# Patient Record
Sex: MALE | Race: WHITE | NOT HISPANIC OR LATINO | Employment: UNEMPLOYED | URBAN - METROPOLITAN AREA
[De-identification: names, ages, dates, MRNs, and addresses within clinical notes are randomized per-mention and may not be internally consistent; named-entity substitution may affect disease eponyms.]

---

## 2017-01-04 ENCOUNTER — APPOINTMENT (OUTPATIENT)
Dept: PHYSICAL THERAPY | Facility: CLINIC | Age: 42
End: 2017-01-04
Payer: COMMERCIAL

## 2017-01-04 PROCEDURE — 97110 THERAPEUTIC EXERCISES: CPT

## 2017-01-04 PROCEDURE — 97140 MANUAL THERAPY 1/> REGIONS: CPT

## 2017-01-05 ENCOUNTER — GENERIC CONVERSION - ENCOUNTER (OUTPATIENT)
Dept: OTHER | Facility: OTHER | Age: 42
End: 2017-01-05

## 2017-01-05 ENCOUNTER — APPOINTMENT (OUTPATIENT)
Dept: PHYSICAL THERAPY | Facility: CLINIC | Age: 42
End: 2017-01-05
Payer: COMMERCIAL

## 2017-01-05 PROCEDURE — 97140 MANUAL THERAPY 1/> REGIONS: CPT

## 2017-01-05 PROCEDURE — 97010 HOT OR COLD PACKS THERAPY: CPT

## 2017-01-05 PROCEDURE — 97110 THERAPEUTIC EXERCISES: CPT

## 2017-01-10 ENCOUNTER — APPOINTMENT (OUTPATIENT)
Dept: PHYSICAL THERAPY | Facility: CLINIC | Age: 42
End: 2017-01-10
Payer: COMMERCIAL

## 2017-01-10 PROCEDURE — 97110 THERAPEUTIC EXERCISES: CPT

## 2017-01-10 PROCEDURE — 97140 MANUAL THERAPY 1/> REGIONS: CPT

## 2017-01-10 PROCEDURE — 97010 HOT OR COLD PACKS THERAPY: CPT

## 2017-01-12 ENCOUNTER — APPOINTMENT (OUTPATIENT)
Dept: PHYSICAL THERAPY | Facility: CLINIC | Age: 42
End: 2017-01-12
Payer: COMMERCIAL

## 2017-01-16 ENCOUNTER — ALLSCRIPTS OFFICE VISIT (OUTPATIENT)
Dept: OTHER | Facility: OTHER | Age: 42
End: 2017-01-16

## 2017-02-27 ENCOUNTER — ALLSCRIPTS OFFICE VISIT (OUTPATIENT)
Dept: OTHER | Facility: OTHER | Age: 42
End: 2017-02-27

## 2018-01-12 VITALS
WEIGHT: 315 LBS | DIASTOLIC BLOOD PRESSURE: 82 MMHG | SYSTOLIC BLOOD PRESSURE: 125 MMHG | HEART RATE: 108 BPM | BODY MASS INDEX: 51.83 KG/M2

## 2018-01-14 VITALS
SYSTOLIC BLOOD PRESSURE: 142 MMHG | WEIGHT: 315 LBS | HEART RATE: 107 BPM | BODY MASS INDEX: 50.25 KG/M2 | DIASTOLIC BLOOD PRESSURE: 101 MMHG

## 2018-01-31 RX ORDER — HYDROCODONE BITARTRATE AND ACETAMINOPHEN 5; 325 MG/1; MG/1
TABLET ORAL
COMMUNITY
End: 2019-07-31

## 2018-02-01 VITALS
BODY MASS INDEX: 52.92 KG/M2 | SYSTOLIC BLOOD PRESSURE: 102 MMHG | DIASTOLIC BLOOD PRESSURE: 67 MMHG | WEIGHT: 315 LBS | HEART RATE: 92 BPM

## 2018-02-01 DIAGNOSIS — S46.211S BICEPS RUPTURE, DISTAL, RIGHT, SEQUELA: Primary | ICD-10-CM

## 2018-02-01 PROCEDURE — 99213 OFFICE O/P EST LOW 20 MIN: CPT | Performed by: ORTHOPAEDIC SURGERY

## 2018-02-01 NOTE — PROGRESS NOTES
Assessment:       1  Biceps rupture, distal, right, sequela          Plan:        - we will provide PT script for right biceps strengthening  - we have provided him with information for a physiatrist he can see for any work restriction issues moving forward   - he demonstrates appropriate healing of right distal biceps rupture s/p repair            Subjective:     Patient ID: Jayme Grier is a 39 y o  male  Chief Complaint:    Patient had right distal biceps rupture repair done in November 2016  He demonstrated appropriate healing post-operatively, and initially began PT  However due to insurance issues he was unable to complete full course of PT  Recently, he noted some weakness in the right upper extremity while on a job climbing down a ladder, he is interested in resuming PT for ongoing strengthening  He is still able to perform appropriate elbow flexion and his strength issues resolved after the recent episode  No other complaints  Social History     Occupational History    Not on file  Social History Main Topics    Smoking status: Never Smoker    Smokeless tobacco: Never Used    Alcohol use No    Drug use: No    Sexual activity: Not on file      Review of Systems   Constitutional: Negative  Negative for chills and fever  HENT: Negative  Respiratory: Negative  Negative for shortness of breath and wheezing  Cardiovascular: Negative  Negative for chest pain and palpitations  Gastrointestinal: Negative  Negative for nausea and vomiting  Endocrine: Negative  Genitourinary: Negative  Skin: Negative  Allergic/Immunologic: Negative  Neurological: Negative  Negative for numbness  Hematological: Negative  Psychiatric/Behavioral: Negative  Objective:     Right Elbow Exam     Tenderness   The patient is experiencing no tenderness           Range of Motion   Extension: normal   Flexion: normal   Pronation: normal   Supination: normal     Muscle Strength The patient has normal right elbow strength  Pronation:  5/5   Supination:  5/5     Other   Erythema: absent  Right elbow scars: well healed, keloid formation  Sensation: normal  Pulse: present    Comments:  Negative active biceps test        Physical Exam   Constitutional: He is oriented to person, place, and time  He appears well-developed and well-nourished  HENT:   Head: Normocephalic and atraumatic  Neck: Normal range of motion  Cardiovascular: Normal rate and intact distal pulses  Pulmonary/Chest: Effort normal  He has no wheezes  Abdominal: Soft  He exhibits no distension  Neurological: He is alert and oriented to person, place, and time  Skin: Skin is warm and dry  Psychiatric: He has a normal mood and affect           No pertinent imaging to be reviewed at today's visit

## 2018-02-06 ENCOUNTER — EVALUATION (OUTPATIENT)
Dept: PHYSICAL THERAPY | Facility: CLINIC | Age: 43
End: 2018-02-06
Payer: COMMERCIAL

## 2018-02-06 DIAGNOSIS — S46.211S BICEPS RUPTURE, DISTAL, RIGHT, SEQUELA: ICD-10-CM

## 2018-02-06 PROCEDURE — 97162 PT EVAL MOD COMPLEX 30 MIN: CPT | Performed by: PHYSICAL THERAPIST

## 2018-02-06 PROCEDURE — 97110 THERAPEUTIC EXERCISES: CPT | Performed by: PHYSICAL THERAPIST

## 2018-02-06 PROCEDURE — G8984 CARRY CURRENT STATUS: HCPCS | Performed by: PHYSICAL THERAPIST

## 2018-02-06 PROCEDURE — G8985 CARRY GOAL STATUS: HCPCS | Performed by: PHYSICAL THERAPIST

## 2018-02-06 RX ORDER — PENICILLIN V POTASSIUM 500 MG/1
500 TABLET ORAL EVERY 6 HOURS SCHEDULED
COMMUNITY
End: 2019-07-31

## 2018-02-06 NOTE — PROGRESS NOTES
PT Evaluation     Today's date: 2018  Patient name: Cephas Harada  : 1975  MRN: [id number]  Referring provider: Pamela Whitten MD  Dx:   Encounter Diagnosis   Name Primary?  Biceps rupture, distal, right, sequela                   Assessment    Assessment details: Cephas Harada is a pleasant 39 y o  presenting to physical therapy with MD referral for Biceps rupture, distal, right, sequela  Problem list: Poor posture, decreased upper extremity strength, poor  strength, decreased functional upper extremity strength, and mild decrease in elbow AROM extension  Treatment to include: Manual therapy techniques as needed, functional upper extremity and core strengthening, instruction in a comprehensive HEP, modalities as needed  This pt would benefit from skilled PT services to address their impairments and functional limitations to maximize functional outcome  Barriers to therapy: BMI of 52 92, working in [facility]- unsure how long he will be in the area to do PT  Understanding of Dx/Px/POC: good   Prognosis: good    Goals  ST  Pt will improve  strength on right to at least 40 lbs in 3 weeks  2  Pt will be able to perform 30 wall push ups with no more than mild fatigue in 3 weeks  LT  Pt will be able to perform job tasks in 6 weeks  2  Pt will be independent in a comprehensive HEP in 6 weeks  Subjective Evaluation    History of Present Illness  Mechanism of injury: Pt underwent distal biceps tendon repair in 2016  Pt attended physical therapy for some time; however, due to insurance issues, pt was unable to continue with physical therapy  Pt has since returned to work; however, his job role has changed recently requiring him to climb ladders (8-10 stories)  Pt states upon descending the ladder, his right arm completely exhausted and he almost fell from the ladder  Pt returned to surgeon to ensure there was no damage to his biceps repair   MD inspected and repair was intact  MD provided referral to PT to improve muscle strength and endurance as well as a referral to a physiatrist for occupation restrictions  Pt reports occasional tingling due to ulnar nerve transposition  Premorbid status:  - ADLs: Independent with no difficulty  - Work: Full time, Full duty  - Recreation: weight training 3 x per week and cardio daily    Current status:  - ADLs/Functional activities:     - Reaching into shoulder height cabinets with Pain Levels: no pain   - Reaching into overhead cabinets with Pain Levels: no pain   - Washing lower back/tucking in shirt with Pain Levels: no pain   - Washing hair with Pain Levels: no pain   - Driving with Pain Levels: no pain   - Lifting 50# luggage < 5x without pain, but with multiple attempts fatigue   - Climbing ladders with severe fatigue and difficulty   - Sleeping with 0 nightly sleep disturbances due to pain  - Moderate difficulty opening tight jars  - Work: Full time, Full duty  - Recreation: cardio   Pain  Current pain ratin  At best pain ratin  At worst pain ratin  Location: biceps  Quality: burning  Relieving factors: rest  Progression: improved    Patient Goals  Patient goals for therapy: increased strength          Objective     Palpation     Additional Palpation Details  Tenderness to palpation over distal biceps tendon on right  Hypertrophic scar tissue present with good mobility over underlying tissue      Active Range of Motion     Left Elbow   Flexion: 138 degrees   Extension: 3 degrees     Right Elbow   Flexion: 137 degrees   Extension: 5 degrees     Passive Range of Motion     Left Elbow   Extension: 0 degrees     Right Elbow   Extension: 0 degrees     Strength/Myotome Testing     Left Shoulder     Planes of Motion   Flexion: 4   Abduction: 4   External rotation at 0°: 5   Internal rotation at 0°: 5     Right Shoulder     Planes of Motion   Flexion: 4   Abduction: 4   External rotation at 0°: 4+   Internal rotation at 0°: 4+     Left Elbow   Flexion: 5  Extension: 5  Forearm supination: 5  Forearm pronation: 5    Right Elbow   Flexion: 4  Extension: 4  Forearm supination: 4  Forearm pronation: 4    Left Wrist/Hand   Wrist extension: 5  Wrist flexion: 5     (2nd hand position)     Trial 1: 40    Trial 2: 55    Trial 3: 60    Right Wrist/Hand   Wrist extension: 4  Wrist flexion: 4     (2nd hand position)     Trial 1: 25    Trial 2: 25    Trial 3: 20    Additional Strength Details  Wall push ups: moderate fatigue after 10 reps  Precautions: distal biceps surgery November 2016    Daily Treatment Diary     Manual  2-6            None                                                                     Exercise Diary  2-6            UBE front/back 2'/2' NV level 2            Wall push ups 2 x 10 NV                         Bicep curls (3 way) 2 x 10 ea 5# NV                         TB:             - Tricep extension 2 x 10 Blue NV            - Rows 2 x 10 Black NV            - Pulldowns  2 x 10 Blue NV                         Shoulder flex, scap, abd 2 x 10 3# NV                         Digiflex all fingers 10 x 10" NV Black            Digiflex ea finger 20x Black NV                                                                                              Modalities  2-6            Cryo as needed                                         On initial evaluation, educated pt to consider joining the wellness program to focus on lower extremity strengthening and conditioning  Provided pt with basic Hep and therband and ensured proper exercise performance

## 2018-02-21 ENCOUNTER — APPOINTMENT (OUTPATIENT)
Dept: PHYSICAL THERAPY | Facility: CLINIC | Age: 43
End: 2018-02-21
Payer: COMMERCIAL

## 2018-02-26 ENCOUNTER — OFFICE VISIT (OUTPATIENT)
Dept: PHYSICAL THERAPY | Facility: CLINIC | Age: 43
End: 2018-02-26
Payer: COMMERCIAL

## 2018-02-26 DIAGNOSIS — S46.211S BICEPS RUPTURE, DISTAL, RIGHT, SEQUELA: Primary | ICD-10-CM

## 2018-02-26 PROCEDURE — 97110 THERAPEUTIC EXERCISES: CPT

## 2018-02-26 NOTE — PROGRESS NOTES
Daily Note     Today's date: 2018  Patient name: Misha Glez  : 1975  MRN: [id number]  Referring provider: Bette Moser MD  Dx:   Encounter Diagnosis     ICD-10-CM    1  Biceps rupture, distal, right, sequela S46 211S                   Subjective: Pt states no new complaints  Objective: See treatment diary below  Precautions: distal biceps surgery 2016     Daily Treatment Diary      Manual  -                   None                                                                                                                             Exercise Diary  -                   UBE front/back 2'/2' NV level 2  2'/2' lev 3                   Wall push ups 2 x 10 NV  2x10                                           Bicep curls (3 way) 2 x 10 ea 5# NV  5# 2x10 ea                                           TB:                       - Tricep extension 2 x 10 Blue NV  blue   3x10                   - Rows 2 x 10 Black NV  black 3x10                   - Pulldowns  2 x 10 Blue NV  blue 3x10                                           Shoulder flex, scap, abd 2 x 10 3# NV  3# 2x10ea                                           Digiflex all fingers 10 x 10" NV Black  10x10"  black                   Digiflex ea finger 20x Black NV  black 20x                                                                                                                                                                         Modalities  -                   Cryo as needed    np                                                                         Assessment: Tolerated treatment well  Patient would benefit from continued PT  Pt most challenged, fatigued by 3 way shdr exercises,single digiflex and suhas wall push ups  Plan: Progress treatment as tolerated

## 2018-03-02 ENCOUNTER — OFFICE VISIT (OUTPATIENT)
Dept: PHYSICAL THERAPY | Facility: CLINIC | Age: 43
End: 2018-03-02
Payer: COMMERCIAL

## 2018-03-02 DIAGNOSIS — S46.211S BICEPS RUPTURE, DISTAL, RIGHT, SEQUELA: Primary | ICD-10-CM

## 2018-03-02 PROCEDURE — 97110 THERAPEUTIC EXERCISES: CPT

## 2018-03-02 NOTE — PROGRESS NOTES
Daily Note     Today's date: 3/2/2018  Patient name: Elsie Renteria  : 1975  MRN: [id number]  Referring provider: Denisa Guo MD  Dx:   Encounter Diagnosis     ICD-10-CM    1  Biceps rupture, distal, right, sequela S46 211S                   Subjective: Pt reports DOMS of biceps after last visit  Objective: See treatment diary below  Precautions: distal biceps surgery 2016     Daily Treatment Diary      Manual  -                   None                                                                                                                             Exercise Diary  -6  2/26  3/1                 UBE front/back 2'/2' NV level 2  2'/2' lev 3  2'/2' lev 3                 Wall push ups 2 x 10 NV  2x10  2x10                                         Bicep curls (3 way) 2 x 10 ea 5# NV  5# 2x10 ea  5# 3x10 ea                                         TB:                       - Tricep extension 2 x 10 Blue NV  blue   3x10  blk 3x10                 - Rows 2 x 10 Black NV  black 3x10  black 3x10                 - Pulldowns  2 x 10 Blue NV  blue 3x10  black 3x10                                         Shoulder flex, scap, abd 2 x 10 3# NV  3# 2x10ea  3# 2x10 ea                                         Digiflex all fingers 10 x 10" NV Black  10x10"  black  blk 10x10"                 Digiflex ea finger 20x Black NV  black 20x  black x20                                                                                                                                                                       Modalities  -                   Cryo as needed    np                                                                         Assessment: Tolerated treatment well  Patient would benefit from continued PT  Continues to be challenged by wall pushups  Plan: Progress treatment as tolerated

## 2018-03-05 ENCOUNTER — OFFICE VISIT (OUTPATIENT)
Dept: PHYSICAL THERAPY | Facility: CLINIC | Age: 43
End: 2018-03-05
Payer: COMMERCIAL

## 2018-03-05 DIAGNOSIS — S46.211S BICEPS RUPTURE, DISTAL, RIGHT, SEQUELA: Primary | ICD-10-CM

## 2018-03-05 PROCEDURE — 97110 THERAPEUTIC EXERCISES: CPT

## 2018-03-05 NOTE — PROGRESS NOTES
Daily Note     Today's date: 3/5/2018  Patient name: Jean Paul Fleming  : 1975  MRN: [id number]  Referring provider: Priya Ritter MD  Dx:   Encounter Diagnosis     ICD-10-CM    1  Biceps rupture, distal, right, sequela S46 211S                   Subjective: Pt states no new complaints  Objective: See treatment diary below  Precautions: distal biceps surgery 2016     Daily Treatment Diary      Manual  -                   None                                                                                                                             Exercise Diary  -6  2/26  3/1  3               UBE front/back 2'/2' NV level 2  2'/2' lev 3  2'/2' lev 3  2'/2' lev 3               Wall push ups 2 x 10 NV  2x10    3x10  table nv                                     Bicep curls (3 way) 2 x 10 ea 5# NV  5# 2x10 ea  5# 3x10 ea  6# 3x10 ea                                       TB:                       - Tricep extension 2 x 10 Blue NV  blue   3x10  blk 3x10  blk 3x10               - Rows 2 x 10 Black NV  black 3x10  black 3x10  blk 3x10               - Pulldowns  2 x 10 Blue NV  blue 3x10  black 3x10  blk 3x10                - D1, D2 flexion        gr 2x10               Shoulder flex, scap, abd 2 x 10 3# NV  3# 2x10ea  3#  3# 3x10 ea  CLX nv                                     Digiflex all fingers 10 x 10" NV Black  10x10"  black   10x10"               Digiflex ea finger 20x Black NV  black 20x    20x ea                                                                                                                                                                     Modalities  2-6                     Cryo as needed    np                                                                         Assessment: Tolerated treatment well  Patient exhibited good technique with therapeutic exercises  Progressed most TE to 30 reps each; min difficulty noted   Also added D1, D2 flexion w/ GTB w/ min fatigue noted  Plan: Progress treatment as tolerated

## 2018-03-09 ENCOUNTER — APPOINTMENT (OUTPATIENT)
Dept: PHYSICAL THERAPY | Facility: CLINIC | Age: 43
End: 2018-03-09
Payer: COMMERCIAL

## 2018-03-28 PROCEDURE — G8986 CARRY D/C STATUS: HCPCS | Performed by: PHYSICAL THERAPIST

## 2018-03-28 PROCEDURE — G8985 CARRY GOAL STATUS: HCPCS | Performed by: PHYSICAL THERAPIST

## 2018-03-28 NOTE — PROGRESS NOTES
Addendum to add discharge G-codes and resolve episode  Pt become non-compliant with outlined POC and has not returned follow-up phone calls  At this point, pt will be discharged

## 2019-07-31 ENCOUNTER — HOSPITAL ENCOUNTER (EMERGENCY)
Facility: HOSPITAL | Age: 44
End: 2019-08-01
Attending: EMERGENCY MEDICINE | Admitting: EMERGENCY MEDICINE
Payer: COMMERCIAL

## 2019-07-31 DIAGNOSIS — T50.902A INTENTIONAL DRUG OVERDOSE, INITIAL ENCOUNTER (HCC): Primary | ICD-10-CM

## 2019-07-31 DIAGNOSIS — Z00.8 MEDICAL CLEARANCE FOR PSYCHIATRIC ADMISSION: Primary | ICD-10-CM

## 2019-07-31 LAB
ALBUMIN SERPL BCP-MCNC: 3.5 G/DL (ref 3.5–5)
ALBUMIN SERPL BCP-MCNC: 3.8 G/DL (ref 3.5–5)
ALP SERPL-CCNC: 73 U/L (ref 46–116)
ALP SERPL-CCNC: 79 U/L (ref 46–116)
ALT SERPL W P-5'-P-CCNC: 28 U/L (ref 12–78)
ALT SERPL W P-5'-P-CCNC: 39 U/L (ref 12–78)
AMPHETAMINES SERPL QL SCN: NEGATIVE
ANION GAP SERPL CALCULATED.3IONS-SCNC: 13 MMOL/L (ref 4–13)
APAP SERPL-MCNC: 6.5 UG/ML (ref 10–20)
APAP SERPL-MCNC: <2 UG/ML (ref 10–20)
APTT PPP: 24 SECONDS (ref 23–37)
AST SERPL W P-5'-P-CCNC: 10 U/L (ref 5–45)
AST SERPL W P-5'-P-CCNC: 14 U/L (ref 5–45)
ATRIAL RATE: 103 BPM
BACTERIA UR QL AUTO: ABNORMAL /HPF
BARBITURATES UR QL: NEGATIVE
BASOPHILS # BLD AUTO: 0.05 THOUSANDS/ΜL (ref 0–0.1)
BASOPHILS NFR BLD AUTO: 1 % (ref 0–1)
BENZODIAZ UR QL: NEGATIVE
BILIRUB DIRECT SERPL-MCNC: 0.08 MG/DL (ref 0–0.2)
BILIRUB SERPL-MCNC: 0.4 MG/DL (ref 0.2–1)
BILIRUB SERPL-MCNC: 0.4 MG/DL (ref 0.2–1)
BILIRUB UR QL STRIP: NEGATIVE
BUN SERPL-MCNC: 9 MG/DL (ref 5–25)
CALCIUM SERPL-MCNC: 8.8 MG/DL (ref 8.3–10.1)
CHLORIDE SERPL-SCNC: 106 MMOL/L (ref 100–108)
CLARITY UR: CLEAR
CO2 SERPL-SCNC: 23 MMOL/L (ref 21–32)
COCAINE UR QL: NEGATIVE
COLOR UR: YELLOW
CREAT SERPL-MCNC: 1.43 MG/DL (ref 0.6–1.3)
EOSINOPHIL # BLD AUTO: 0.07 THOUSAND/ΜL (ref 0–0.61)
EOSINOPHIL NFR BLD AUTO: 2 % (ref 0–6)
ERYTHROCYTE [DISTWIDTH] IN BLOOD BY AUTOMATED COUNT: 13.2 % (ref 11.6–15.1)
ETHANOL SERPL-MCNC: <3 MG/DL (ref 0–3)
GFR SERPL CREATININE-BSD FRML MDRD: 59 ML/MIN/1.73SQ M
GLUCOSE SERPL-MCNC: 103 MG/DL (ref 65–140)
GLUCOSE UR STRIP-MCNC: NEGATIVE MG/DL
HCT VFR BLD AUTO: 49.5 % (ref 36.5–49.3)
HGB BLD-MCNC: 16.1 G/DL (ref 12–17)
HGB UR QL STRIP.AUTO: ABNORMAL
IMM GRANULOCYTES # BLD AUTO: 0.01 THOUSAND/UL (ref 0–0.2)
IMM GRANULOCYTES NFR BLD AUTO: 0 % (ref 0–2)
INR PPP: 1.17 (ref 0.84–1.19)
KETONES UR STRIP-MCNC: NEGATIVE MG/DL
LEUKOCYTE ESTERASE UR QL STRIP: NEGATIVE
LYMPHOCYTES # BLD AUTO: 2.13 THOUSANDS/ΜL (ref 0.6–4.47)
LYMPHOCYTES NFR BLD AUTO: 44 % (ref 14–44)
MCH RBC QN AUTO: 28.5 PG (ref 26.8–34.3)
MCHC RBC AUTO-ENTMCNC: 32.5 G/DL (ref 31.4–37.4)
MCV RBC AUTO: 88 FL (ref 82–98)
METHADONE UR QL: NEGATIVE
MONOCYTES # BLD AUTO: 0.53 THOUSAND/ΜL (ref 0.17–1.22)
MONOCYTES NFR BLD AUTO: 11 % (ref 4–12)
MUCOUS THREADS UR QL AUTO: ABNORMAL
NEUTROPHILS # BLD AUTO: 2.03 THOUSANDS/ΜL (ref 1.85–7.62)
NEUTS SEG NFR BLD AUTO: 42 % (ref 43–75)
NITRITE UR QL STRIP: NEGATIVE
NON-SQ EPI CELLS URNS QL MICRO: ABNORMAL /HPF
NRBC BLD AUTO-RTO: 0 /100 WBCS
OPIATES UR QL SCN: NEGATIVE
P AXIS: 41 DEGREES
PCP UR QL: NEGATIVE
PH UR STRIP.AUTO: 6 [PH]
PLATELET # BLD AUTO: 274 THOUSANDS/UL (ref 149–390)
PMV BLD AUTO: 10.3 FL (ref 8.9–12.7)
POTASSIUM SERPL-SCNC: 3.6 MMOL/L (ref 3.5–5.3)
PR INTERVAL: 174 MS
PROT SERPL-MCNC: 7.1 G/DL (ref 6.4–8.2)
PROT SERPL-MCNC: 7.2 G/DL (ref 6.4–8.2)
PROT UR STRIP-MCNC: NEGATIVE MG/DL
PROTHROMBIN TIME: 14.3 SECONDS (ref 11.6–14.5)
QRS AXIS: -4 DEGREES
QRSD INTERVAL: 88 MS
QT INTERVAL: 330 MS
QTC INTERVAL: 424 MS
RBC # BLD AUTO: 5.64 MILLION/UL (ref 3.88–5.62)
RBC #/AREA URNS AUTO: ABNORMAL /HPF
SALICYLATES SERPL-MCNC: 5.3 MG/DL (ref 3–20)
SODIUM SERPL-SCNC: 142 MMOL/L (ref 136–145)
SP GR UR STRIP.AUTO: 1.01 (ref 1–1.03)
T WAVE AXIS: 1 DEGREES
THC UR QL: NEGATIVE
TSH SERPL DL<=0.05 MIU/L-ACNC: 2.84 UIU/ML (ref 0.36–3.74)
UROBILINOGEN UR QL STRIP.AUTO: 0.2 E.U./DL
VENTRICULAR RATE: 99 BPM
WBC # BLD AUTO: 4.82 THOUSAND/UL (ref 4.31–10.16)
WBC #/AREA URNS AUTO: ABNORMAL /HPF

## 2019-07-31 PROCEDURE — 81001 URINALYSIS AUTO W/SCOPE: CPT | Performed by: EMERGENCY MEDICINE

## 2019-07-31 PROCEDURE — 80053 COMPREHEN METABOLIC PANEL: CPT | Performed by: EMERGENCY MEDICINE

## 2019-07-31 PROCEDURE — 85730 THROMBOPLASTIN TIME PARTIAL: CPT | Performed by: EMERGENCY MEDICINE

## 2019-07-31 PROCEDURE — 85610 PROTHROMBIN TIME: CPT | Performed by: EMERGENCY MEDICINE

## 2019-07-31 PROCEDURE — 99285 EMERGENCY DEPT VISIT HI MDM: CPT

## 2019-07-31 PROCEDURE — 93005 ELECTROCARDIOGRAM TRACING: CPT

## 2019-07-31 PROCEDURE — 96360 HYDRATION IV INFUSION INIT: CPT

## 2019-07-31 PROCEDURE — 80307 DRUG TEST PRSMV CHEM ANLYZR: CPT | Performed by: EMERGENCY MEDICINE

## 2019-07-31 PROCEDURE — 96361 HYDRATE IV INFUSION ADD-ON: CPT

## 2019-07-31 PROCEDURE — 36415 COLL VENOUS BLD VENIPUNCTURE: CPT | Performed by: EMERGENCY MEDICINE

## 2019-07-31 PROCEDURE — 93010 ELECTROCARDIOGRAM REPORT: CPT | Performed by: INTERNAL MEDICINE

## 2019-07-31 PROCEDURE — 99449 NTRPROF PH1/NTRNET/EHR 31/>: CPT | Performed by: EMERGENCY MEDICINE

## 2019-07-31 PROCEDURE — 99291 CRITICAL CARE FIRST HOUR: CPT | Performed by: EMERGENCY MEDICINE

## 2019-07-31 PROCEDURE — 85025 COMPLETE CBC W/AUTO DIFF WBC: CPT | Performed by: EMERGENCY MEDICINE

## 2019-07-31 PROCEDURE — 80076 HEPATIC FUNCTION PANEL: CPT | Performed by: EMERGENCY MEDICINE

## 2019-07-31 PROCEDURE — 80329 ANALGESICS NON-OPIOID 1 OR 2: CPT | Performed by: EMERGENCY MEDICINE

## 2019-07-31 PROCEDURE — 80320 DRUG SCREEN QUANTALCOHOLS: CPT | Performed by: EMERGENCY MEDICINE

## 2019-07-31 PROCEDURE — 99244 OFF/OP CNSLTJ NEW/EST MOD 40: CPT | Performed by: PSYCHIATRY & NEUROLOGY

## 2019-07-31 PROCEDURE — 84443 ASSAY THYROID STIM HORMONE: CPT | Performed by: EMERGENCY MEDICINE

## 2019-07-31 PROCEDURE — NC001 PR NO CHARGE: Performed by: EMERGENCY MEDICINE

## 2019-07-31 RX ORDER — RISPERIDONE 1 MG/1
1 TABLET, ORALLY DISINTEGRATING ORAL EVERY 6 HOURS PRN
Status: CANCELLED | OUTPATIENT
Start: 2019-07-31

## 2019-07-31 RX ORDER — HALOPERIDOL 5 MG
5 TABLET ORAL EVERY 6 HOURS PRN
Status: CANCELLED | OUTPATIENT
Start: 2019-07-31

## 2019-07-31 RX ORDER — HYDROXYZINE HYDROCHLORIDE 25 MG/1
25 TABLET, FILM COATED ORAL EVERY 6 HOURS PRN
Status: CANCELLED | OUTPATIENT
Start: 2019-07-31

## 2019-07-31 RX ORDER — BENZTROPINE MESYLATE 1 MG/ML
1 INJECTION INTRAMUSCULAR; INTRAVENOUS EVERY 6 HOURS PRN
Status: CANCELLED | OUTPATIENT
Start: 2019-07-31

## 2019-07-31 RX ORDER — MAGNESIUM HYDROXIDE/ALUMINUM HYDROXICE/SIMETHICONE 120; 1200; 1200 MG/30ML; MG/30ML; MG/30ML
30 SUSPENSION ORAL EVERY 4 HOURS PRN
Status: CANCELLED | OUTPATIENT
Start: 2019-07-31

## 2019-07-31 RX ORDER — PHENAZOPYRIDINE HYDROCHLORIDE 100 MG/1
200 TABLET, FILM COATED ORAL ONCE
Status: COMPLETED | OUTPATIENT
Start: 2019-07-31 | End: 2019-07-31

## 2019-07-31 RX ORDER — BENZTROPINE MESYLATE 1 MG/1
1 TABLET ORAL EVERY 6 HOURS PRN
Status: CANCELLED | OUTPATIENT
Start: 2019-07-31

## 2019-07-31 RX ORDER — IBUPROFEN 600 MG/1
600 TABLET ORAL EVERY 8 HOURS PRN
Status: CANCELLED | OUTPATIENT
Start: 2019-07-31

## 2019-07-31 RX ORDER — IBUPROFEN 400 MG/1
800 TABLET ORAL EVERY 8 HOURS PRN
Status: CANCELLED | OUTPATIENT
Start: 2019-07-31

## 2019-07-31 RX ORDER — TRAZODONE HYDROCHLORIDE 50 MG/1
50 TABLET ORAL
Status: CANCELLED | OUTPATIENT
Start: 2019-07-31

## 2019-07-31 RX ADMIN — SODIUM CHLORIDE 1000 ML: 0.9 INJECTION, SOLUTION INTRAVENOUS at 11:08

## 2019-07-31 RX ADMIN — PHENAZOPYRIDINE 200 MG: 100 TABLET ORAL at 12:57

## 2019-07-31 NOTE — ED NOTES
Patient calm and cooperative  Having nice conversation with patient  Patients wife at bedside       Xiomara Flynn  07/31/19 [id number]

## 2019-07-31 NOTE — ED NOTES
Intake / Safety assessment completed with patient  Patient's wife was also present  Patient presents to ED due to intentional overdose  Patient states that he feels hopeless and just wants to end it all  Patient states he took either half a bottle of Excedrin or Motrin, but he is not he is not sure  He also reports taking a 1/2 bottle of Nyquil  Patient has a history of depression for which he is not treated  He reports having a previous suicide attempt approximatly one year ago  Patient also reports having a history of PTSD  Patient reports being a  after serving in [employer] for 8 years  Patient denied homicidal ideation  He also denied any hallucinations  He reports poor sleep and decreased appetite  Multiple stressors which include marital and financial problems  Patient willing to sign 12 which Dr was in agreement with  201 faxed to Intake for review

## 2019-07-31 NOTE — ED PROVIDER NOTES
History  Chief Complaint   Patient presents with    Overdose - Intentional     pt presents s/p intentional overdose  per EMS the wife reports that he took "a couple excedrin, less than half a bottle of zzquil"  per wife they dont keep much in the house for meds  wife recieved text that pt was going to "end things" pt not conversing with EMS but did tell them he wanted to end his pain  during triage pt reports no physcial pain but h/o ptsd ans depression  is a vet  pt reports taking half bottle of motrin, a full bottle of nyquil, 1/2 bottle of excedrin early AM     48-year-old gentleman brought in for intentional overdose  Patient states that he feels hopeless and just wants to end it all  Patient states he took either half a bottle of Excedrin or Motrin he is not sure and half a bottle of NyQuil  He reports different amounts of different meds to different people  Patient has a history of depression for which he is not treated  He has had a previous suicide attempt approximately 1 year ago  Patient has a history of depression PTSD and is    Patient not sure what time he took the med see states it was still dark out this morning when he took the meds  History provided by:  Patient and spouse   used: No    Overdose - Intentional   Ingested substance:  OTC medication  Time since incident:  6 hours  Ingestion amount:   It is unclear but some combination of Motrin and Excedrin and NyQuil  Witnesses present: no    Called poison control: no    Incident location:  Home  Context: intentional ingestion and suicide attempt    Associated symptoms: anxiety    Associated symptoms: no abdominal pain, no agitation, no chest pain, no cough, no headaches, no visual changes and no vomiting    Risk factors: hx of suicide attempts        None       Past Medical History:   Diagnosis Date    Asthma     Depression     Fractures     Head injury     Psychiatric illness     PTSD (post-traumatic stress disorder)     Sleep difficulties     [facility]icide attempt [facility])        Past Surgical History:   Procedure Laterality Date    COCCYX FRACTURE SURGERY      MN REINSERT BI/TRICEPS TENDON,DISTAL Right 11/22/2016    Procedure: DISTAL BICEPS REPAIR;  Surgeon: Donato Mayer MD;  Location:  MAIN OR;  Service: Orthopedics       Family History   Problem Relation Age of Onset    Cancer Mother     Glaucoma Maternal Grandmother      I have reviewed and agree with the history as documented  Social History     Tobacco Use    Smoking status: Never Smoker    Smokeless tobacco: Never Used   Substance Use Topics    Alcohol use: No     Frequency: Never     Binge frequency: Never    Drug use: No        Review of Systems   Constitutional: Negative for activity change and appetite change  HENT: Negative for congestion and facial swelling  Eyes: Negative for discharge and redness  Respiratory: Negative for cough and wheezing  Cardiovascular: Negative for chest pain and leg swelling  Gastrointestinal: Negative for abdominal distention, abdominal pain, blood in stool and vomiting  Endocrine: Negative for cold intolerance and polydipsia  Genitourinary: Negative for difficulty urinating and hematuria  Musculoskeletal: Negative for arthralgias and gait problem  Skin: Negative for color change and rash  Allergic/Immunologic: Negative for food allergies and immunocompromised state  Neurological: Negative for dizziness, seizures and headaches  Hematological: Negative for adenopathy  Does not bruise/bleed easily  Psychiatric/Behavioral: Positive for dysphoric mood and suicidal ideas  Negative for agitation, confusion and decreased concentration  The patient is nervous/anxious  All other systems reviewed and are negative  Physical Exam  Physical Exam   Constitutional: He is oriented to person, place, and time  He appears well-developed and well-nourished  Non-toxic appearance  HENT:   Head: Normocephalic and atraumatic  Right Ear: Tympanic membrane normal    Left Ear: Tympanic membrane normal    Nose: Nose normal    Mouth/Throat: Oropharynx is clear and moist    Eyes: Pupils are equal, round, and reactive to light  Conjunctivae, EOM and lids are normal    Neck: Trachea normal and normal range of motion  Neck supple  No JVD present  Carotid bruit is not present  Cardiovascular: Normal rate, regular rhythm, normal heart sounds and intact distal pulses  No extrasystoles are present  Pulmonary/Chest: Effort normal  He has no decreased breath sounds  He has no wheezes  He has no rhonchi  He has no rales  He exhibits no tenderness and no deformity  Abdominal: Soft  Bowel sounds are normal  There is no tenderness  There is no rebound, no guarding and no CVA tenderness  Musculoskeletal:        Right shoulder: He exhibits normal range of motion, no tenderness, no swelling and no deformity  Cervical back: Normal  He exhibits normal range of motion, no tenderness, no bony tenderness and no deformity  Lymphadenopathy:     He has no cervical adenopathy  He has no axillary adenopathy  Neurological: He is alert and oriented to person, place, and time  He has normal strength and normal reflexes  No cranial nerve deficit or sensory deficit  He displays a negative Romberg sign  Skin: Skin is warm and dry  Psychiatric: He is withdrawn  Cognition and memory are normal  He expresses impulsivity  He exhibits a depressed mood  He expresses suicidal ideation  Nursing note and vitals reviewed        Vital Signs  ED Triage Vitals [07/31/19 1047]   Temperature Pulse Respirations Blood Pressure SpO2   98 °F (36 7 °C) 104 16 148/92 99 %      Temp Source Heart Rate Source Patient Position - Orthostatic VS BP Location FiO2 (%)   Oral Monitor Sitting Right arm --      Pain Score       No Pain           Vitals:    07/31/19 1421 07/31/19 2050 07/31/19 2231 08/01/19 0524   BP: (!) 142/103 130/79  149/96   Pulse: 86 92 88 58   Patient Position - Orthostatic VS: Sitting   Sitting         Visual Acuity      ED Medications  Medications   sodium chloride 0 9 % bolus 1,000 mL (0 mL Intravenous Stopped 7/31/19 1603)   phenazopyridine (PYRIDIUM) tablet 200 mg (200 mg Oral Given 7/31/19 1257)       Diagnostic Studies  Results Reviewed     Procedure Component Value Units Date/Time    Hepatic function panel [[id number]]  (Normal) Collected:  07/31/19 1521    Lab Status:  Final result Specimen:  Blood from Arm, Right Updated:  07/31/19 1539     Total Bilirubin 0 40 mg/dL      Bilirubin, Direct 0 08 mg/dL      Alkaline Phosphatase 73 U/L      AST 10 U/L      ALT 28 U/L      Total Protein 7 2 g/dL      Albumin 3 5 g/dL     Acetaminophen level-If concentration is detectable, please discuss with medical  on call   [[id number]]  (Abnormal) Collected:  07/31/19 1521    Lab Status:  Final result Specimen:  Blood from Arm, Right Updated:  07/31/19 1538     Acetaminophen Level <2 ug/mL     Urine Microscopic [[id number]]  (Abnormal) Collected:  07/31/19 1138    Lab Status:  Final result Specimen:  Urine, Clean Catch Updated:  07/31/19 1425     RBC, UA 1-2 /hpf      WBC, UA 2-4 /hpf      Epithelial Cells Occasional /hpf      Bacteria, UA None Seen /hpf      MUCUS THREADS Occasional    UA w Reflex to Microscopic w Reflex to Culture [[id number]]  (Abnormal) Collected:  07/31/19 1138    Lab Status:  Final result Specimen:  Urine, Clean Catch Updated:  07/31/19 1332     Color, UA Yellow     Clarity, UA Clear     Specific Gravity, UA 1 015     pH, UA 6 0     Leukocytes, UA Negative     Nitrite, UA Negative     Protein, UA Negative mg/dl      Glucose, UA Negative mg/dl      Ketones, UA Negative mg/dl      Urobilinogen, UA 0 2 E U /dl      Bilirubin, UA Negative     Blood, UA Small    TSH, 3rd generation [[id number]]  (Normal) Collected:  07/31/19 1102    Lab Status:  Final result Specimen:  Blood from Arm, Left Updated: 07/31/19 1158     TSH 3RD GENERATON 2 843 uIU/mL     Narrative:       Patients undergoing fluorescein dye angiography may retain small amounts of fluorescein in the body for 48-72 hours post procedure  Samples containing fluorescein can produce falsely depressed TSH values  If the patient had this procedure,a specimen should be resubmitted post fluorescein clearance  Rapid drug screen, urine [[id number]]  (Normal) Collected:  07/31/19 1138    Lab Status:  Final result Specimen:  Urine, Clean Catch Updated:  07/31/19 1151     Amph/Meth UR Negative     Barbiturate Ur Negative     Benzodiazepine Urine Negative     Cocaine Urine Negative     Methadone Urine Negative     Opiate Urine Negative     PCP Ur Negative     THC Urine Negative    Narrative:       FOR MEDICAL PURPOSES ONLY  IF CONFIRMATION NEEDED PLEASE CONTACT THE LAB WITHIN 5 DAYS      Drug Screen Cutoff Levels:  AMPHETAMINE/METHAMPHETAMINES  1000 ng/mL  BARBITURATES     200 ng/mL  BENZODIAZEPINES     200 ng/mL  COCAINE      300 ng/mL  METHADONE      300 ng/mL  OPIATES      300 ng/mL  PHENCYCLIDINE     25 ng/mL  THC       50 ng/mL      Ethanol [[id number]]  (Normal) Collected:  07/31/19 1102    Lab Status:  Final result Specimen:  Blood from Arm, Left Updated:  07/31/19 1126     Ethanol Lvl <3 mg/dL     Comprehensive metabolic panel [[id number]]  (Abnormal) Collected:  07/31/19 1102    Lab Status:  Final result Specimen:  Blood from Arm, Left Updated:  07/31/19 1125     Sodium 142 mmol/L      Potassium 3 6 mmol/L      Chloride 106 mmol/L      CO2 23 mmol/L      ANION GAP 13 mmol/L      BUN 9 mg/dL      Creatinine 1 43 mg/dL      Glucose 103 mg/dL      Calcium 8 8 mg/dL      AST 14 U/L      ALT 39 U/L      Alkaline Phosphatase 79 U/L      Total Protein 7 1 g/dL      Albumin 3 8 g/dL      Total Bilirubin 0 40 mg/dL      eGFR 59 ml/min/1 73sq m     Narrative:       [facility] guidelines for Chronic Kidney Disease (CKD):     Stage 1 with normal or high GFR (GFR > 90 mL/min/1 73 square meters)    Stage 2 Mild CKD (GFR = 60-89 mL/min/1 73 square meters)    Stage 3A Moderate CKD (GFR = 45-59 mL/min/1 73 square meters)    Stage 3B Moderate CKD (GFR = 30-44 mL/min/1 73 square meters)    Stage 4 Severe CKD (GFR = 15-29 mL/min/1 73 square meters)    Stage 5 End Stage CKD (GFR <15 mL/min/1 73 square meters)  Note: GFR calculation is accurate only with a steady state creatinine    Salicylate level [[id number]]  (Normal) Collected:  07/31/19 1102    Lab Status:  Final result Specimen:  Blood from Arm, Left Updated:  18/15/54 5141     Salicylate Lvl 5 3 mg/dL     Acetaminophen level-If concentration is detectable, please discuss with medical  on call   [[id number]]  (Abnormal) Collected:  07/31/19 1102    Lab Status:  Final result Specimen:  Blood from Arm, Left Updated:  07/31/19 1125     Acetaminophen Level 6 5 ug/mL     Protime-INR [[id number]]  (Normal) Collected:  07/31/19 1102    Lab Status:  Final result Specimen:  Blood from Arm, Left Updated:  07/31/19 1118     Protime 14 3 seconds      INR 1 17    APTT [[id number]]  (Normal) Collected:  07/31/19 1102    Lab Status:  Final result Specimen:  Blood from Arm, Left Updated:  07/31/19 1118     PTT 24 seconds     CBC and differential [[id number]]  (Abnormal) Collected:  07/31/19 1102    Lab Status:  Final result Specimen:  Blood from Arm, Left Updated:  07/31/19 1110     WBC 4 82 Thousand/uL      RBC 5 64 Million/uL      Hemoglobin 16 1 g/dL      Hematocrit 49 5 %      MCV 88 fL      MCH 28 5 pg      MCHC 32 5 g/dL      RDW 13 2 %      MPV 10 3 fL      Platelets 120 Thousands/uL      nRBC 0 /100 WBCs      Neutrophils Relative 42 %      Immat GRANS % 0 %      Lymphocytes Relative 44 %      Monocytes Relative 11 %      Eosinophils Relative 2 %      Basophils Relative 1 %      Neutrophils Absolute 2 03 Thousands/µL      Immature Grans Absolute 0 01 Thousand/uL      Lymphocytes Absolute 2 13 Thousands/µL      Monocytes Absolute 0 53 Thousand/µL      Eosinophils Absolute 0 07 Thousand/µL      Basophils Absolute 0 05 Thousands/µL                  No orders to display              Procedures  ECG 12 Lead Documentation Only  Date/Time: 7/31/2019 10:57 AM  Performed by: Radha Daly DO  Authorized by: Radha Daly DO     Patient location:  ED  Rate:     ECG rate:  99  Rhythm:     Rhythm: sinus rhythm    Ectopy:     Ectopy: none    QRS:     QRS axis:  Normal    CriticalCare Time  Performed by: Radha Daly DO  Authorized by: Radha Daly DO     Critical care provider statement:     Critical care time (minutes):  40    Critical care was necessary to treat or prevent imminent or life-threatening deterioration of the following conditions:  Toxidrome    Critical care was time spent personally by me on the following activities:  Obtaining history from patient or surrogate, development of treatment plan with patient or surrogate, discussions with primary provider, discussions with consultants, examination of patient, ordering and performing treatments and interventions, ordering and review of radiographic studies, ordering and review of laboratory studies, re-evaluation of patient's condition and review of old charts           ED Course                               MDM  Number of Diagnoses or Management Options  Intentional drug overdose, initial encounter [facility]): new and requires workup     Amount and/or Complexity of Data Reviewed  Clinical lab tests: ordered and reviewed  Tests in the medicine section of CPT®: reviewed  Discuss the patient with other providers: yes  Independent visualization of images, tracings, or specimens: yes    Patient Progress  Patient progress: stable      Disposition  Final diagnoses:   Intentional drug overdose, initial encounter [facility])     Time reflects when diagnosis was documented in both MDM as applicable and the Disposition within this note     Time User Action Codes Description Comment    8/1/2019  1:40 AM Lázarokaya Sbkaycee NOHEMI Add [[id number]] Intentional drug overdose, initial encounter [facility])       ED Disposition     ED Disposition Condition Date/Time Comment    Transfer to [facility] Aug 1, 2019  1:39 AM Abisai Parekh should be transferred out to [address] [facility] and has been medically cleared  MD Documentation      Most Recent Value   Patient Condition  The patient has been stabilized such that within reasonable medical probability, no material deterioration of the patient condition or the condition of the unborn child(jett) is likely to result from the transfer   Reason for Transfer  Level of Care needed not available at this facility   Benefits of Transfer  Specialized equipment and/or services available at the receiving facility (Include comment)________________________   Risks of Transfer  Potential for delay in receiving treatment, Potential deterioration of medical condition, Increased discomfort during transfer, Possible worsening of condition or death during transfer   Accepting Physician  Dr Marcy Mendez NameÁlvaro [address] [place], [place]    (Name & Tel number)  [facility]   Transported by [facility] and Unit #)  [facility]    Sending MD Cleveland Mac   Provider Certification  General risk, such as traffic hazards, adverse weather conditions, rough terrain or turbulence, possible failure of equipment (including vehicle or aircraft), or consequences of actions of persons outside the control of the transport personnel      RN Documentation      [address] Álvaro Stewart [facility] [place]    (Name & Tel number)  [facility]   Transported by [facility] and Unit #)  [facility]       Follow-up Information    None         There are no discharge medications for this patient  No discharge procedures on file      ED Provider  Electronically Signed by           Lisa Linares DO  08/01/19 1931

## 2019-07-31 NOTE — ED NOTES
Patient in room speaking with Psychiatrist   Patient is standing in corner of the room while conversing  1;1 sitter is present  Will continue to monitor       Surekha Matt  07/31/19 7861

## 2019-07-31 NOTE — ED NOTES
Patient calmly watching tv with lights and tv on in room  Wife is at bedside  1;1 sitter is present  Will continue to monitor       Sohail Delcid  07/31/19 7201
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
## 2019-07-31 NOTE — ED NOTES
Pt ambulated from ED 5 to [facility] FACILITY 20 by ED Addie Kay with negative complications  Pt calm and cooperative  Pt changed into hospital safety outfit with negative complications  Personal clothing removed from room, labeled and secured into locker 20 by ED Maine Barrera  Psychiatrist Resident at bedside to evaluate pt        Ann Epstein RN  07/31/19 5708

## 2019-07-31 NOTE — ED NOTES
Patient states he is better and wants to leave, advised Aakash Osman  07/31/19 [address]  07/31/19 1300

## 2019-08-01 ENCOUNTER — HOSPITAL ENCOUNTER (INPATIENT)
Facility: HOSPITAL | Age: 44
LOS: 6 days | Discharge: HOME | DRG: 885 | End: 2019-08-07
Attending: PSYCHIATRY & NEUROLOGY | Admitting: PSYCHIATRY & NEUROLOGY
Payer: COMMERCIAL

## 2019-08-01 VITALS
OXYGEN SATURATION: 97 % | SYSTOLIC BLOOD PRESSURE: 149 MMHG | RESPIRATION RATE: 18 BRPM | TEMPERATURE: 98 F | BODY MASS INDEX: 48.38 KG/M2 | WEIGHT: 315 LBS | DIASTOLIC BLOOD PRESSURE: 96 MMHG | HEART RATE: 58 BPM

## 2019-08-01 DIAGNOSIS — F33.2 SEVERE EPISODE OF RECURRENT MAJOR DEPRESSIVE DISORDER, WITHOUT PSYCHOTIC FEATURES (HCC): Primary | ICD-10-CM

## 2019-08-01 DIAGNOSIS — Z00.8 MEDICAL CLEARANCE FOR PSYCHIATRIC ADMISSION: ICD-10-CM

## 2019-08-01 DIAGNOSIS — F43.10 PTSD (POST-TRAUMATIC STRESS DISORDER): ICD-10-CM

## 2019-08-01 PROBLEM — F41.1 GENERALIZED ANXIETY DISORDER: Status: ACTIVE | Noted: 2019-08-01

## 2019-08-01 LAB
ALBUMIN SERPL BCP-MCNC: 3.6 G/DL (ref 3.5–5)
ALP SERPL-CCNC: 73 U/L (ref 46–116)
ALT SERPL W P-5'-P-CCNC: 23 U/L (ref 12–78)
AMPHETAMINES SERPL QL SCN: NEGATIVE
ANION GAP SERPL CALCULATED.3IONS-SCNC: 10 MMOL/L (ref 4–13)
AST SERPL W P-5'-P-CCNC: 12 U/L (ref 5–45)
BARBITURATES UR QL: NEGATIVE
BASOPHILS # BLD AUTO: 0.06 THOUSANDS/ΜL (ref 0–0.1)
BASOPHILS NFR BLD AUTO: 1 % (ref 0–1)
BENZODIAZ UR QL: NEGATIVE
BILIRUB SERPL-MCNC: 0.4 MG/DL (ref 0.2–1)
BUN SERPL-MCNC: 11 MG/DL (ref 5–25)
CALCIUM SERPL-MCNC: 8.8 MG/DL (ref 8.3–10.1)
CHLORIDE SERPL-SCNC: 107 MMOL/L (ref 100–108)
CHOLEST SERPL-MCNC: 181 MG/DL (ref 50–200)
CO2 SERPL-SCNC: 25 MMOL/L (ref 21–32)
COCAINE UR QL: NEGATIVE
CREAT SERPL-MCNC: 1.4 MG/DL (ref 0.6–1.3)
EOSINOPHIL # BLD AUTO: 0.16 THOUSAND/ΜL (ref 0–0.61)
EOSINOPHIL NFR BLD AUTO: 2 % (ref 0–6)
ERYTHROCYTE [DISTWIDTH] IN BLOOD BY AUTOMATED COUNT: 13.5 % (ref 11.6–15.1)
GFR SERPL CREATININE-BSD FRML MDRD: 60 ML/MIN/1.73SQ M
GLUCOSE SERPL-MCNC: 84 MG/DL (ref 65–140)
HCT VFR BLD AUTO: 50.5 % (ref 36.5–49.3)
HDLC SERPL-MCNC: 40 MG/DL (ref 40–60)
HGB BLD-MCNC: 16.2 G/DL (ref 12–17)
IMM GRANULOCYTES # BLD AUTO: 0.01 THOUSAND/UL (ref 0–0.2)
IMM GRANULOCYTES NFR BLD AUTO: 0 % (ref 0–2)
LDLC SERPL CALC-MCNC: 131 MG/DL (ref 0–100)
LYMPHOCYTES # BLD AUTO: 3.24 THOUSANDS/ΜL (ref 0.6–4.47)
LYMPHOCYTES NFR BLD AUTO: 50 % (ref 14–44)
MCH RBC QN AUTO: 28.5 PG (ref 26.8–34.3)
MCHC RBC AUTO-ENTMCNC: 32.1 G/DL (ref 31.4–37.4)
MCV RBC AUTO: 89 FL (ref 82–98)
METHADONE UR QL: NEGATIVE
MONOCYTES # BLD AUTO: 0.74 THOUSAND/ΜL (ref 0.17–1.22)
MONOCYTES NFR BLD AUTO: 11 % (ref 4–12)
NEUTROPHILS # BLD AUTO: 2.39 THOUSANDS/ΜL (ref 1.85–7.62)
NEUTS SEG NFR BLD AUTO: 36 % (ref 43–75)
NONHDLC SERPL-MCNC: 141 MG/DL
NRBC BLD AUTO-RTO: 0 /100 WBCS
OPIATES UR QL SCN: NEGATIVE
PCP UR QL: NEGATIVE
PLATELET # BLD AUTO: 270 THOUSANDS/UL (ref 149–390)
PMV BLD AUTO: 10.5 FL (ref 8.9–12.7)
POTASSIUM SERPL-SCNC: 4 MMOL/L (ref 3.5–5.3)
PROT SERPL-MCNC: 7.5 G/DL (ref 6.4–8.2)
RBC # BLD AUTO: 5.68 MILLION/UL (ref 3.88–5.62)
SODIUM SERPL-SCNC: 142 MMOL/L (ref 136–145)
THC UR QL: NEGATIVE
TRIGL SERPL-MCNC: 50 MG/DL
TSH SERPL DL<=0.05 MIU/L-ACNC: 1.55 UIU/ML (ref 0.36–3.74)
WBC # BLD AUTO: 6.6 THOUSAND/UL (ref 4.31–10.16)

## 2019-08-01 PROCEDURE — 86592 SYPHILIS TEST NON-TREP QUAL: CPT | Performed by: PSYCHIATRY & NEUROLOGY

## 2019-08-01 PROCEDURE — 80307 DRUG TEST PRSMV CHEM ANLYZR: CPT | Performed by: PSYCHIATRY & NEUROLOGY

## 2019-08-01 PROCEDURE — 80053 COMPREHEN METABOLIC PANEL: CPT | Performed by: PSYCHIATRY & NEUROLOGY

## 2019-08-01 PROCEDURE — 84443 ASSAY THYROID STIM HORMONE: CPT | Performed by: PSYCHIATRY & NEUROLOGY

## 2019-08-01 PROCEDURE — 99254 IP/OBS CNSLTJ NEW/EST MOD 60: CPT | Performed by: PHYSICIAN ASSISTANT

## 2019-08-01 PROCEDURE — 85025 COMPLETE CBC W/AUTO DIFF WBC: CPT | Performed by: PSYCHIATRY & NEUROLOGY

## 2019-08-01 PROCEDURE — 80061 LIPID PANEL: CPT | Performed by: PSYCHIATRY & NEUROLOGY

## 2019-08-01 PROCEDURE — 99222 1ST HOSP IP/OBS MODERATE 55: CPT | Performed by: PSYCHIATRY & NEUROLOGY

## 2019-08-01 RX ORDER — IBUPROFEN 800 MG/1
800 TABLET ORAL EVERY 8 HOURS PRN
Status: DISCONTINUED | OUTPATIENT
Start: 2019-08-01 | End: 2019-08-01

## 2019-08-01 RX ORDER — HYDROXYZINE HYDROCHLORIDE 25 MG/1
25 TABLET, FILM COATED ORAL EVERY 6 HOURS PRN
Status: DISCONTINUED | OUTPATIENT
Start: 2019-08-01 | End: 2019-08-07 | Stop reason: HOSPADM

## 2019-08-01 RX ORDER — HALOPERIDOL 5 MG
5 TABLET ORAL EVERY 6 HOURS PRN
Status: DISCONTINUED | OUTPATIENT
Start: 2019-08-01 | End: 2019-08-07 | Stop reason: HOSPADM

## 2019-08-01 RX ORDER — PRAZOSIN HYDROCHLORIDE 1 MG/1
1 CAPSULE ORAL
Status: DISCONTINUED | OUTPATIENT
Start: 2019-08-01 | End: 2019-08-03

## 2019-08-01 RX ORDER — BENZTROPINE MESYLATE 1 MG/1
1 TABLET ORAL EVERY 6 HOURS PRN
Status: DISCONTINUED | OUTPATIENT
Start: 2019-08-01 | End: 2019-08-07 | Stop reason: HOSPADM

## 2019-08-01 RX ORDER — IBUPROFEN 600 MG/1
600 TABLET ORAL EVERY 8 HOURS PRN
Status: DISCONTINUED | OUTPATIENT
Start: 2019-08-01 | End: 2019-08-01

## 2019-08-01 RX ORDER — RISPERIDONE 1 MG/1
1 TABLET, ORALLY DISINTEGRATING ORAL EVERY 6 HOURS PRN
Status: DISCONTINUED | OUTPATIENT
Start: 2019-08-01 | End: 2019-08-07 | Stop reason: HOSPADM

## 2019-08-01 RX ORDER — TRAZODONE HYDROCHLORIDE 50 MG/1
50 TABLET ORAL
Status: DISCONTINUED | OUTPATIENT
Start: 2019-08-01 | End: 2019-08-07 | Stop reason: HOSPADM

## 2019-08-01 RX ORDER — SERTRALINE HYDROCHLORIDE 25 MG/1
25 TABLET, FILM COATED ORAL DAILY
Status: DISCONTINUED | OUTPATIENT
Start: 2019-08-01 | End: 2019-08-02

## 2019-08-01 RX ORDER — MAGNESIUM HYDROXIDE/ALUMINUM HYDROXICE/SIMETHICONE 120; 1200; 1200 MG/30ML; MG/30ML; MG/30ML
30 SUSPENSION ORAL EVERY 4 HOURS PRN
Status: DISCONTINUED | OUTPATIENT
Start: 2019-08-01 | End: 2019-08-07 | Stop reason: HOSPADM

## 2019-08-01 RX ORDER — BENZTROPINE MESYLATE 1 MG/ML
1 INJECTION INTRAMUSCULAR; INTRAVENOUS EVERY 6 HOURS PRN
Status: DISCONTINUED | OUTPATIENT
Start: 2019-08-01 | End: 2019-08-07 | Stop reason: HOSPADM

## 2019-08-01 RX ADMIN — SERTRALINE HYDROCHLORIDE 25 MG: 25 TABLET ORAL at 12:18

## 2019-08-01 RX ADMIN — PRAZOSIN HYDROCHLORIDE 1 MG: 1 CAPSULE ORAL at 22:19

## 2019-08-01 NOTE — EMTALA/ACUTE CARE TRANSFER
[address] [place] [address]  Dept: [phone]      EMTALA TRANSFER CONSENT    NAME Mike Godinez                                         1975                              MRN [id number]    I have been informed of my rights regarding examination, treatment, and transfer   by Dr Geneva Cardenas DO    Benefits: Specialized equipment and/or services available at the receiving facility (Include comment)________________________    Risks: Potential for delay in receiving treatment, Potential deterioration of medical condition, Increased discomfort during transfer, Possible worsening of condition or death during transfer      Consent for Transfer:  I acknowledge that my medical condition has been evaluated and explained to me by the emergency department physician or other qualified medical person and/or my attending physician, who has recommended that I be transferred to the service of  Accepting Physician: Dr Kevin Hurley at [facility] Name, [address] : [address]  The above potential benefits of such transfer, the potential risks associated with such transfer, and the probable risks of not being transferred have been explained to me, and I fully understand them  The doctor has explained that, in my case, the benefits of transfer outweigh the risks  I agree to be transferred  I authorize the performance of emergency medical procedures and treatments upon me in both transit and upon arrival at the receiving facility  Additionally, I authorize the release of any and all medical records to the receiving facility and request they be transported with me, if possible  I understand that the safest mode of transportation during a medical emergency is an ambulance and that the Hospital advocates the use of this mode of transport   Risks of traveling to the receiving facility by car, including absence of medical control, life sustaining equipment, such as oxygen, and medical personnel has been explained to me and I fully understand them  (KALEN CORRECT BOX BELOW)  [  ]  I consent to the stated transfer and to be transported by ambulance/helicopter  [  ]  I consent to the stated transfer, but refuse transportation by ambulance and accept full responsibility for my transportation by car  I understand the risks of non-ambulance transfers and I exonerate the Hospital and its staff from any deterioration in my condition that results from this refusal     X___________________________________________    DATE  19  TIME________  Signature of patient or legally responsible individual signing on patient behalf           RELATIONSHIP TO PATIENT_________________________          Provider Certification    NAME Joe Laguerre                                         1975                              MRN [id number]    A medical screening exam was performed on the above named patient  Based on the examination:    Condition Necessitating Transfer The encounter diagnosis was Intentional drug overdose, initial encounter ([facility] [address] )      Patient Condition: The patient has been stabilized such that within reasonable medical probability, no material deterioration of the patient condition or the condition of the unborn child(jett) is likely to result from the transfer    Reason for Transfer: Level of Care needed not available at this facility    Transfer Requirements: [facility] [address] [place] [address]   · Space available and qualified personnel available for treatment as acknowledged by [employer]  · Agreed to accept transfer and to provide appropriate medical treatment as acknowledged by       Dr Michael Alfaro  · Appropriate medical records of the examination and treatment of the patient are provided at the time of transfer   [address] _______  · Transfer will be performed by qualified personnel from [employer]   and appropriate transfer equipment as required, including the use of necessary and appropriate life support measures  Provider Certification: I have examined the patient and explained the following risks and benefits of being transferred/refusing transfer to the patient/family:  General risk, such as traffic hazards, adverse weather conditions, rough terrain or turbulence, possible failure of equipment (including vehicle or aircraft), or consequences of actions of persons outside the control of the transport personnel      Based on these reasonable risks and benefits to the patient and/or the unborn child(jett), and based upon the information available at the time of the patients examination, I certify that the medical benefits reasonably to be expected from the provision of appropriate medical treatments at another medical facility outweigh the increasing risks, if any, to the individuals medical condition, and in the case of labor to the unborn child, from effecting the transfer      X____________________________________________ DATE 08/01/19        TIME_______      ORIGINAL - SEND TO MEDICAL RECORDS   COPY - SEND WITH PATIENT DURING TRANSFER

## 2019-08-01 NOTE — ED NOTES
Report given to EMS and Report called to [facility]- spoke to Lakshmi Honeycutt  Pt  With no needs or issues at time of transport  Pt  Calm and cooperative, belongings sent with pt       Gabrielle Wilhelm RN  08/01/19 9228
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
## 2019-08-01 NOTE — PROGRESS NOTES
Pt's belongings on admission In closet 255#1   Backpack   Laptop  2 I Pads   Apple watch      2 [employer]m  Power pack   GPS  Keys   Headphones   Paperwork       Belongings at bedside   Brainjuicer

## 2019-08-01 NOTE — ED NOTES
Patient sleeping with lights and tv on in room  Wife at bedside  1;1 sitter is present  Will continue to monitor       Kalina Rodrigues  07/31/19 2057

## 2019-08-01 NOTE — ED NOTES
Pt sleeping on bed  Nurse 1:1 present  Will continue to monitor pt        Abhi Glover  08/01/19 2971

## 2019-08-01 NOTE — CONSULTS
Consultation - Cooper Alfaro 38 y o  male MRN: [id number]    Unit/Bed#: [facility] 251-02 Encounter: [id number]      Assessment/Plan     Assessment:  Depression with Suicide attempt with NSAID overdose mixed with Nyquil  Acute kidney injury with NSAID overdose unknown quantity of ibuprofen  Hematuria in the setting of NSAID overdose without UTI    Plan:  Repeat Cr today is 1 4, will monitor  Encourage PO fluid intake  Pyridium prn dysuria    History of Present Illness   HPI: Cooper Alfaro is a 38y o  year old male who presents after attempted overdose with ibuprofen and nyquil  Patient is unsure how much or when he took the medications  He denies history of kidney disease, chronic illness, recent illness, or open wounds  He reports after his overdose he felt like he was urinating razor blades  This pain resolved with Pyridium administered in the ER  He denies previous dysuria, hematuria, frequency or urgency  Inpatient consult for Medical Clearance for [facility] patient  Consult performed by: Steven Kennedy PA-C  Consult ordered by: Michael Kahn MD          Review of Systems   Constitutional: Negative for activity change, chills, diaphoresis and fever  HENT: Negative for congestion, postnasal drip, rhinorrhea, sinus pressure, sinus pain and sneezing  Eyes: Negative for photophobia, redness and itching  Respiratory: Negative for cough, chest tightness, shortness of breath and wheezing  Cardiovascular: Negative for chest pain and palpitations  Gastrointestinal: Negative for abdominal distention, abdominal pain, constipation, diarrhea, nausea and vomiting  Genitourinary: Positive for dysuria  Negative for difficulty urinating, discharge, flank pain, frequency, hematuria, penile pain and urgency  Musculoskeletal: Negative for arthralgias, back pain and neck pain  Neurological: Negative for dizziness, syncope and headaches     Psychiatric/Behavioral: Positive for decreased concentration, dysphoric mood, sleep disturbance and suicidal ideas (attempt)  Historical Information   Past Medical History:   Diagnosis Date    Asthma     Depression     Fractures     Head injury     Psychiatric illness     PTSD (post-traumatic stress disorder)     Sleep difficulties     Suicide attempt [facility])      Past Surgical History:   Procedure Laterality Date    COCCYX FRACTURE SURGERY      TX REINSERT BI/TRICEPS TENDON,DISTAL Right 11/22/2016    Procedure: DISTAL BICEPS REPAIR;  Surgeon: Trudy Cruz MD;  Location: [facility];  Service: Orthopedics     Social History   Social History     Substance and Sexual Activity   Alcohol Use No    Frequency: Never    Binge frequency: Never     Social History     Substance and Sexual Activity   Drug Use No     Social History     Tobacco Use   Smoking Status Never Smoker   Smokeless Tobacco Never Used     Family History: non-contributory    Meds/Allergies   PTA meds:   None     Allergies   Allergen Reactions    Demerol [Meperidine] GI Intolerance and Abdominal Pain    Percocet [Oxycodone-Acetaminophen] GI Intolerance       Objective   Vitals: Blood pressure 165/89, pulse 82, temperature 99 5 °F (37 5 °C), temperature source Tympanic, resp  rate 18, height 6' (1 829 m), weight (!) 156 kg (344 lb 6 4 oz), SpO2 98 %  No intake or output data in the 24 hours ending 08/01/19 1143  Invasive Devices     Epidural Line            Nerve Block Catheter 11/22/16 982 days                Physical Exam   Constitutional: He is oriented to person, place, and time  He appears well-developed and well-nourished  No distress  HENT:   Head: Normocephalic and atraumatic  Mouth/Throat: No oral lesions  Dental caries present  Eyes: Pupils are equal, round, and reactive to light  EOM are normal    Neck: Normal range of motion  Neck supple  Cardiovascular: Normal rate, regular rhythm and normal heart sounds  Exam reveals no gallop and no friction rub  No murmur heard    Pulmonary/Chest: Effort normal  No respiratory distress  He has no wheezes  He has no rales  Abdominal: Soft  Bowel sounds are normal  He exhibits no distension  There is no tenderness  There is no guarding  Musculoskeletal: Normal range of motion  Neurological: He is alert and oriented to person, place, and time  Skin: Skin is warm and dry  Psychiatric: His speech is normal  Thought content normal  He is withdrawn  He exhibits a depressed mood  Lab Results: I have personally reviewed pertinent reports  Imaging Studies: I have personally reviewed pertinent reports

## 2019-08-01 NOTE — ED NOTES
Pt sleeping on bed  Tech 1:1 present  Will continue to monitor pt        Xavier Vasquez  08/01/19 0038

## 2019-08-01 NOTE — CASE MANAGEMENT
Pt met with CM to review & sign an FAUSTINO for his wife, Emily North  Pt is a 37 y/o male who reported that stressors in the home with him, his wife, & their 26 y/o daughter who is currently home from [facility]  Pt said that there is an, "us against him" dynamic & it has been growing  Pt giving several examples of how he wanted to discipline their daughter for a something minor, & his wife wouldn't, & then it turned into a much larger issue  Pt said that he use to have anger issues & throw couches & punch walls & his wife would listen to what he had to say  Pt said that he got help, & no longer was angry & now he would want to talk things through & she wouldn't listen & would refuse  Pt said that he asked his wife if she hated him, & she responded, "Hate is a strong word   ", so he left her room  Pt said that he did nothing to harm anyone else, & he doesn't understand why he was not allowed to end his suffering  Pt said that he feels that his wife and daughter would be sad for a short period of time, but would get on with their lives, & he wanted to be able to do that for them  Pt reported he is originally from [place] [address] his family is there  He said that they moved to the [facility], so his wife could be near her family in [place]  Pt said that he didn't know his biological father & his mother is not supportive; Pt is the oldest of 5 children  Pt reported a family history of substance abuse on his mother's side  Pt reported that this is his first admission for mental health treatment  Pt said that he did have one previous suicide attempt when year ago, when h was traveling & spending a lot of time away from home  Pt said that his wife would come with him sometimes when he traveled to nice places, but if it wasn't a fun place like [place] or [facility], she couldn't be bothered to visit  Pt said he was very lonely & took a bunch of pills; Pt said he vomited them up & did not get treatment    Pt denied seeing a psychiatrist or therapist in the community  Pt said he did see someone over 20 years ago for anger issues  Pt agreeable with referrals for outpatient therapy & medication management  CM said that Pt's wife and/or daughter may be able to meet with him & his therapist if he makes this request  Pt said that his wife usually refuses to go for any counseling  Pt denied having a PCP  Pt reported a history of alcohol, but has been sober for 20 years  Pt denied any other substances or tobacco use  Pt reported that he grew up in the [place], & although he believes in a higher being, he cannot buy into all the Bible says  Pt reported that he completed some college  He served 8 years in the [employer]  Pt reported he was was laid of as a  for [employer] about 1 5 years ago  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  Pt said that he has been having a hard time finding work, as he is over qualified  Pt said eliot this wife is great with finances & had been saving for them, however, he recently learned that she had been paying their daughter's college tuition & had lied & said that she had taken out loans  Pt denied any legal issues  Pt denied having access to firearms  Pt reported he drives independently to appointments  Pt said that he still did not agreed with not being able to do what he had set out to do, as he had asked his wife not to call emergency personnel  Pt said that he is here now & working with the doctor, & maybe medications will help him & things will get better

## 2019-08-01 NOTE — ED NOTES
Patient resting comfortably with lights and tv on in room  Wife at bedside  1:1 sitter is present  Will continue to monitor       Xiomara Flynn  07/31/19 2022

## 2019-08-01 NOTE — ED NOTES
Pt laying on bed watching TV  Nurse 1:1 present  Will continue to monitor pt        Chhaya Vasquez  08/01/19 0532

## 2019-08-01 NOTE — ED NOTES
Taking over pt observation  Pt sleeping on bed  Wife at bedside  TV on and light off  Tech 1:1 present  Will continue to monitor pt        Bárbara Eaton  07/31/19 3533

## 2019-08-01 NOTE — ED NOTES
Pt sleeping on bed  Tech 1:1 present  Will continue to monitor pt        Brittnee Hudson  08/01/19 0538

## 2019-08-01 NOTE — PROGRESS NOTES
RN will assess patient at least twice daily for symptoms of admission and educate patient on medications, diagnoses, and coping skills  able

## 2019-08-01 NOTE — ED NOTES
Pt laying on bed sleeping  Nurse 1:1 present  Will continue to monitor        Sydney Aguila  08/01/19 0700

## 2019-08-01 NOTE — ED NOTES
Phone call from [place] from Intake  Patient is still being reviewed for in network bed at [facility] but updated pulse ox is needed
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
## 2019-08-01 NOTE — CONSULTS
PHONE [address] Toxicology  Anthony Sidhu 38 y o  male MRN: [id number]  Unit/Bed#: [facility] 20 Encounter: [id number]      Reason for Consult / Principal Problem: Medication overdose  Inpatient consult to Toxicology  Consult performed by: Wilber Aponte MD  Consult ordered by: Iona Perez DO        07/31/19      ASSESSMENT:  1) OTC medication overdose    DISCUSSION/ RECOMMENDATIONS  Patient presented after stated ingestion of OTC medications including ibuprofen and NyQuil  Per my discussion with treating emergency physician, patient is stable with stable vitals and exam   I reviewed initial lab results and recommended repeat labs which I have also reviewed  At this time I do not have any further concerns for development of significant toxicity, and the patient can be medically cleared from toxicology perspective  For further questions, please call [facility]  Service or Patient Access Center to reach the medical  on call  Hx and PE limited by the dynamics of a phone consultation  I have not personally interviewed or evaluated the patient, but only advised based on the information provided to me  Primary provider is responsible for all clinical decisions  Pertinent history, physical exam and clinical findings and course discussed: Anthony Sidhu is a 38y o  year old male who presents with OTC medication overdose  Patient stated he took unknown amounts of OTC meds including ibuprofen and NyQuil in a self-harm attempt  Unknown time of ingestion, but probably some time early this morning  Reported to be overall well with stable vitals and normal exam     Review of systems and physical exam not performed by me      Historical Information   Past Medical History:   Diagnosis Date    Asthma     Depression     Fractures     Head injury      Past Surgical History:   Procedure Laterality Date    COCCYX FRACTURE SURGERY      HI REINSERT BI/TRICEPS TENDON,DISTAL Right 11/22/2016    Procedure: DISTAL BICEPS REPAIR;  Surgeon: Manpreet Lee MD;  Location: [facility];  Service: Orthopedics     Social History   Social History     Substance and Sexual Activity   Alcohol Use No     Social History     Substance and Sexual Activity   Drug Use No     Social History     Tobacco Use   Smoking Status Never Smoker   Smokeless Tobacco Never Used     Family History   Problem Relation Age of Onset    Cancer Mother     Glaucoma Maternal Grandmother         Prior to Admission medications    Medication Sig Start Date End Date Taking? Authorizing Provider   penicillin V potassium (VEETID) 500 mg tablet Take 500 mg by mouth every 6 (six) hours    Historical Provider, MD   HYDROcodone-acetaminophen (NORCO) 5-325 mg per tablet Take by mouth  7/31/19  Historical Provider, MD       No current facility-administered medications for this encounter  Allergies   Allergen Reactions    Demerol [Meperidine] GI Intolerance and Abdominal Pain    Percocet [Oxycodone-Acetaminophen] GI Intolerance       Objective       Intake/Output Summary (Last 24 hours) at 7/31/2019 2044  Last data filed at 7/31/2019 1603  Gross per 24 hour   Intake 1000 ml   Output    Net 1000 ml       Invasive Devices:   Nerve Block Catheter 11/22/16 (Active)       Vitals   Vitals:    07/31/19 1047 07/31/19 1149 07/31/19 1257 07/31/19 1421   BP: 148/92 145/83 145/83 (!) 142/103   TempSrc: Oral      Pulse: 104 81 79 86   Resp: 16 16 18 16   Patient Position - Orthostatic VS: Sitting Lying Lying Sitting   Temp: 98 °F (36 7 °C)          Lab Results: I have personally reviewed pertinent reports        Labs:  Results from last 7 days   Lab Units 07/31/19  1102   WBC Thousand/uL 4 82   HEMOGLOBIN g/dL 16 1   HEMATOCRIT % 49 5*   PLATELETS Thousands/uL 274   NEUTROS PCT % 42*   LYMPHS PCT % 44   MONOS PCT % 11      Results from last 7 days   Lab Units 07/31/19  1521 07/31/19  1102   POTASSIUM mmol/L  --  3 6   CHLORIDE mmol/L  --  106 CO2 mmol/L  --  23   BUN mg/dL  --  9   CREATININE mg/dL  --  1 43*   CALCIUM mg/dL  --  8 8   ALK PHOS U/L 73 79   ALT U/L 28 39   AST U/L 10 14      Results from last 7 days   Lab Units 07/31/19  1102   INR  1 17   PTT seconds 24         No results found for: TROPONINI      Results from last 7 days   Lab Units 07/31/19  1521 07/31/19  1102   ACETAMINOPHEN LVL ug/mL <2* 6 5*   ETHANOL LVL mg/dL  --  <3   SALICYLATE LVL mg/dL  --  5 3     Invalid input(s): EXTPREGUR        Counseling / Coordination of Care  Total time spent today 31 minutes  This was a phone consultation

## 2019-08-01 NOTE — CASE MANAGEMENT
Call placed to patient insurance R [phone] where I was directed to Jordan Webb at [phone]  All clinical was left on the confidential voicemail, awaiting call back

## 2019-08-01 NOTE — ED NOTES
Zhang [phone], spoke with [place] who stated the behavioral health pre cert line is currently closed  Gave patient information and diagnosis code and was given a temporary reference number to be followed up with in the morning [phone]  This call was started and disconnected several times due to system issues  Per EVS, patient not on file

## 2019-08-01 NOTE — ED NOTES
Spoke to pt about where he is going to be going and asked pt for his signature for transport  Pt signed RADHA Cerrato  08/01/19 5616

## 2019-08-01 NOTE — ED NOTES
Patient is sleeping with lights off and tv on in room  Wife is at bedside  1;1 sitter is present  Will continue to monitor       Sharonda Tong  07/31/19 2302

## 2019-08-01 NOTE — CASE MANAGEMENT
Insurance Authorization for admission:   Phone call placed to [employer] number: [id number] to Bee Saunders     7 days approved    Level of care: [facility] inpatient [facility] health   [facility]- 8/7  Review on  8/8  Authorization # [id number]    For concurrent review call Bee Saunders at [phone] x [phone]

## 2019-08-01 NOTE — ED NOTES
Pt  Sitting on bed eating sandwich  Pt  Without distress  Pt  Aware of transfer to [facility]  No needs at this time  Will monitor        Jericho Osman RN  08/01/19 1751 [facility] Morris Mcadams RN  08/01/19 6584

## 2019-08-01 NOTE — TREATMENT PLAN
TREATMENT PLAN REVIEW - [address] Yen 38 y o  1975 male MRN: [id number]    [address] [place] [address] Room / Bed: Etelvina Xie [address] [place] [address] Encounter: [id number]        Admit Date/Time:  8/1/2019 10:24 AM    Treatment Team: Attending Provider: Quiana Grossman; Occupational Therapy Assistant: Dionisio Lainez, [address];  Medications RN: Brianne Delgado RN; Registered Nurse: Kris Chavez RN    Diagnosis: Principal Problem:    Severe episode of recurrent major depressive disorder, without psychotic features ([address] )  Active Problems:    PTSD (post-traumatic stress disorder)    Generalized anxiety disorder    Patient Strengths/Assets: cooperative, motivation for treatment/growth, patient is on a voluntary commitment      Patient Barriers/Limitations: limited support system, low self esteem, marital conflict    Short Term Goals: decrease in depressive symptoms, decrease in anxiety symptoms, decrease in suicidal thoughts    Long Term Goals: improvement in depression, improvement in anxiety, stabilization of mood, free of suicidal thoughts, acceptance of need for psychiatric medications, acceptance of need for psychiatric treatment, acceptance of need for psychiatric follow up after discharge    Progress Towards Goals: starting psychiatric medications as prescribed    Recommended Treatment: medication management, patient medication education, group therapy, milieu therapy, continued Behavioral Health psychiatric evaluation/assessment process     Treatment Frequency: daily medication monitoring, group and milieu therapy daily, monitoring through interdisciplinary rounds, monitoring through weekly patient care conferences    Expected Discharge Date: 7 days - 8/8/2019    Discharge Plan: referral for outpatient medication management with a psychiatrist, referral for outpatient psychotherapy    Treatment Plan Created/Updated By: Quiana Grossman

## 2019-08-02 LAB — RPR SER QL: NORMAL

## 2019-08-02 PROCEDURE — 99232 SBSQ HOSP IP/OBS MODERATE 35: CPT | Performed by: STUDENT IN AN ORGANIZED HEALTH CARE EDUCATION/TRAINING PROGRAM

## 2019-08-02 RX ORDER — PHENAZOPYRIDINE HYDROCHLORIDE 100 MG/1
100 TABLET, FILM COATED ORAL EVERY 8 HOURS PRN
Status: DISCONTINUED | OUTPATIENT
Start: 2019-08-02 | End: 2019-08-07 | Stop reason: HOSPADM

## 2019-08-02 RX ORDER — SERTRALINE HYDROCHLORIDE 25 MG/1
25 TABLET, FILM COATED ORAL DAILY
Status: COMPLETED | OUTPATIENT
Start: 2019-08-02 | End: 2019-08-02

## 2019-08-02 RX ORDER — TRAZODONE HYDROCHLORIDE 50 MG/1
50 TABLET ORAL
Status: DISCONTINUED | OUTPATIENT
Start: 2019-08-02 | End: 2019-08-05

## 2019-08-02 RX ADMIN — PRAZOSIN HYDROCHLORIDE 1 MG: 1 CAPSULE ORAL at 22:20

## 2019-08-02 RX ADMIN — TRAZODONE HYDROCHLORIDE 50 MG: 50 TABLET ORAL at 22:20

## 2019-08-02 RX ADMIN — SERTRALINE HYDROCHLORIDE 25 MG: 25 TABLET ORAL at 12:25

## 2019-08-02 RX ADMIN — SERTRALINE HYDROCHLORIDE 25 MG: 25 TABLET ORAL at 09:22

## 2019-08-02 NOTE — PROGRESS NOTES
Treatment team meeting  Tx plan reviewed and signed  Diagnoses reviewed  Meds: zoloft and minipress added yesterday  Trazodone added for hs tonight   D/C: Thurs

## 2019-08-02 NOTE — PROGRESS NOTES
Progress Note - Behavioral Health   Mike Godinez 38 y o  male MRN: [id number]  Unit/Bed#: U 251-02 Encounter: [id number]    Assessment/Plan   Principal Problem:    Severe episode of recurrent major depressive disorder, without psychotic features ([address] )  Active Problems:    PTSD (post-traumatic stress disorder)    Generalized anxiety disorder    Recommended Treatment:   Increase sertraline to 50 mg daily for management of depression  Start trazodone 50 mg nightly for management of insomnia  Continue Minipress 1 mg nightly for management of PTSD/nightmares  Continue with group therapy, milieu therapy and occupational therapy  Risks, benefits and possible side effects of Medications: Risks, benefits, and possible side effects of medications have been explained to the patient, who verbalizes understanding  Progress Toward Goals: slow improvement    ------------------------------------------------------------    Subjective: Aydee Rodriguez has been compliant with medications without acute side effects  he reports poor sleep last night with nightmares  He now reports he regrets his suicide attempt and verbalizes desire to undergo counseling with his wife, as well as individual therapy as an outpatient  Patient is amenable to increasing sertraline dose and adding trazodone at night for sleep  Patient is consenting for safety on the unit      Psychiatric Review of Systems:  Behavior over the last 24 hours:  improved  Sleep: insomnia, frequent awakenings, nightmares and difficulty falling asleep  Appetite: adequate  Medication side effects: none  ROS: no complaints    Current Medications:    Current Facility-Administered Medications:  aluminum-magnesium hydroxide-simethicone 30 mL Oral Q4H PRN Laurie Kingsley MD   benztropine 1 mg Intramuscular Q6H PRN Laurie Kingsley MD   benztropine 1 mg Oral Q6H PRN Laurie Kingsley MD   haloperidol 5 mg Oral Q6H PRN Laurie Kingsley MD   hydrOXYzine HCL 25 mg Oral Q6H PRN Laurie Kingsley MD magnesium hydroxide 30 mL Oral Daily PRN Boogie Corona MD   phenazopyridine 100 mg Oral Q8H PRN Eleazar Maxx   prazosin 1 mg Oral HS Eleazar Maxx   risperiDONE 1 mg Oral Q6H PRN Boogie Corona MD   sertraline 25 mg Oral Daily Bill Park MD   [START ON 8/3/2019] sertraline 50 mg Oral Daily Bill Park MD   traZODone 50 mg Oral HS PRN Boogie Corona MD   traZODone 50 mg Oral HS Bill Park MD       Behavioral Health Medications: all current active meds have been reviewed  Changes as above  Vital signs in last 24 hours:  Temp:  [97 1 °F (36 2 °C)-98 2 °F (36 8 °C)] 97 1 °F (36 2 °C)  HR:  [68] 68  Resp:  [17-18] 18  BP: (123-134)/(62-83) 123/62    Laboratory results:  I have personally reviewed all pertinent laboratory/tests results    Recent Results (from the past 48 hour(s))   Rapid drug screen, urine    Collection Time: 07/31/19 11:38 AM   Result Value Ref Range    Amph/Meth UR Negative Negative    Barbiturate Ur Negative Negative    Benzodiazepine Urine Negative Negative    Cocaine Urine Negative Negative    Methadone Urine Negative Negative    Opiate Urine Negative Negative    PCP Ur Negative Negative    THC Urine Negative Negative   UA w Reflex to Microscopic w Reflex to Culture    Collection Time: 07/31/19 11:38 AM   Result Value Ref Range    Color, UA Yellow     Clarity, UA Clear     Specific Ophiem, UA 1 015 1 003 - 1 030    pH, UA 6 0 4 5, 5 0, 5 5, 6 0, 6 5, 7 0, 7 5, 8 0    Leukocytes, UA Negative Negative    Nitrite, UA Negative Negative    Protein, UA Negative Negative mg/dl    Glucose, UA Negative Negative mg/dl    Ketones, UA Negative Negative mg/dl    Urobilinogen, UA 0 2 0 2, 1 0 E U /dl E U /dl    Bilirubin, UA Negative Negative    Blood, UA Small (A) Negative   Urine Microscopic    Collection Time: 07/31/19 11:38 AM   Result Value Ref Range    RBC, UA 1-2 (A) None Seen, 0-5 /hpf    WBC, UA 2-4 (A) None Seen, 0-5, 5-55, 5-65 /hpf    Epithelial Cells Occasional None Seen, Occasional /hpf    Bacteria, UA None Seen None Seen, Occasional /hpf    MUCUS THREADS Occasional (A) None Seen   Acetaminophen level-If concentration is detectable, please discuss with medical  on call      Collection Time: 07/31/19  3:21 PM   Result Value Ref Range    Acetaminophen Level <2 (L) 10 - 20 ug/mL   Hepatic function panel    Collection Time: 07/31/19  3:21 PM   Result Value Ref Range    Total Bilirubin 0 40 0 20 - 1 00 mg/dL    Bilirubin, Direct 0 08 0 00 - 0 20 mg/dL    Alkaline Phosphatase 73 46 - 116 U/L    AST 10 5 - 45 U/L    ALT 28 12 - 78 U/L    Total Protein 7 2 6 4 - 8 2 g/dL    Albumin 3 5 3 5 - 5 0 g/dL   CBC and differential    Collection Time: 08/01/19 12:00 PM   Result Value Ref Range    WBC 6 60 4 31 - 10 16 Thousand/uL    RBC 5 68 (H) 3 88 - 5 62 Million/uL    Hemoglobin 16 2 12 0 - 17 0 g/dL    Hematocrit 50 5 (H) 36 5 - 49 3 %    MCV 89 82 - 98 fL    MCH 28 5 26 8 - 34 3 pg    MCHC 32 1 31 4 - 37 4 g/dL    RDW 13 5 11 6 - 15 1 %    MPV 10 5 8 9 - 12 7 fL    Platelets 105 682 - 215 Thousands/uL    nRBC 0 /100 WBCs    Neutrophils Relative 36 (L) 43 - 75 %    Immat GRANS % 0 0 - 2 %    Lymphocytes Relative 50 (H) 14 - 44 %    Monocytes Relative 11 4 - 12 %    Eosinophils Relative 2 0 - 6 %    Basophils Relative 1 0 - 1 %    Neutrophils Absolute 2 39 1 85 - 7 62 Thousands/µL    Immature Grans Absolute 0 01 0 00 - 0 20 Thousand/uL    Lymphocytes Absolute 3 24 0 60 - 4 47 Thousands/µL    Monocytes Absolute 0 74 0 17 - 1 22 Thousand/µL    Eosinophils Absolute 0 16 0 00 - 0 61 Thousand/µL    Basophils Absolute 0 06 0 00 - 0 10 Thousands/µL   Comprehensive metabolic panel    Collection Time: 08/01/19 12:00 PM   Result Value Ref Range    Sodium 142 136 - 145 mmol/L    Potassium 4 0 3 5 - 5 3 mmol/L    Chloride 107 100 - 108 mmol/L    CO2 25 21 - 32 mmol/L    ANION GAP 10 4 - 13 mmol/L    BUN 11 5 - 25 mg/dL    Creatinine 1 40 (H) 0 60 - 1 30 mg/dL    Glucose 84 65 - 140 mg/dL Calcium 8 8 8 3 - 10 1 mg/dL    AST 12 5 - 45 U/L    ALT 23 12 - 78 U/L    Alkaline Phosphatase 73 46 - 116 U/L    Total Protein 7 5 6 4 - 8 2 g/dL    Albumin 3 6 3 5 - 5 0 g/dL    Total Bilirubin 0 40 0 20 - 1 00 mg/dL    eGFR 60 ml/min/1 73sq m   Lipid panel    Collection Time: 08/01/19 12:00 PM   Result Value Ref Range    Cholesterol 181 50 - 200 mg/dL    Triglycerides 50 <=150 mg/dL    HDL, Direct 40 40 - 60 mg/dL    LDL Calculated 131 (H) 0 - 100 mg/dL    Non-HDL-Chol (CHOL-HDL) 141 mg/dl   RPR     Collection Time: 08/01/19 12:00 PM   Result Value Ref Range    RPR Non-Reactive Non-Reactive   TSH, 3rd generation    Collection Time: 08/01/19 12:00 PM   Result Value Ref Range    TSH 3RD GENERATON 1 548 0 358 - 3 740 uIU/mL   Rapid drug screen, urine    Collection Time: 08/01/19  1:18 PM   Result Value Ref Range    Amph/Meth UR Negative Negative    Barbiturate Ur Negative Negative    Benzodiazepine Urine Negative Negative    Cocaine Urine Negative Negative    Methadone Urine Negative Negative    Opiate Urine Negative Negative    PCP Ur Negative Negative    THC Urine Negative Negative        Mental Status Evaluation:  Appearance:  age appropriate, dressed appropriately, looks stated age, overweight  sitting comfortably in chair, adequate hygiene and grooming, cooperative with interview, fair eye contact   Behavior:  cooperative   Speech:  normal rate, normal volume, normal pitch, fluent, clear and coherent   Mood:  depressed   Affect:  constricted and mood-congruent   Language Within normal limits   Thought Process:  organized, goal directed, linear, normal rate of thoughts, normal abstract reasoning    Thought Content:  no overt delusions   Perceptual Disturbances: None   Risk Potential: Suicidal Ideations none, Homicidal Ideations none and Low potential for aggression   Sensorium:  person, place, time and current situation   Cognition & Memory:  Grossly intact   Consciousness:  alert and awake   Attention: attention span and concentration were age appropriate   Insight:  Fair   Judgment: Leticia Cee appears to be of average intelligence   Gait/Station: normal gait/station   Motor Activity: no abnormal movements       This note has been constructed using a voice recognition system  There may be translation, syntax,  or grammatical errors  If you have any questions, please contact the dictating provider

## 2019-08-02 NOTE — PROGRESS NOTES
Status: During the day was reporting nothing is going to change, & he wished he would have been allowed to end his life in peace  Pt currently denying SI & is more hopeful  Pt reported poor sleep but staff said he was not restless or out of bed overnight    Medication: started Zoloft & Prazosin  D/C: late next week     08/02/19 1707   Team Meeting   Meeting Type Daily Rounds   Team Members Present   Team Members Present Physician;Nurse;;Occupational Therapist   Physician Team Member Dr Kate Jackson / Rivas Tran Team Member Dina Curran  Management Team Member [address] / [place]   OT Team Member Kayden   Patient/Family Present   Patient Present No   Patient's Family Present No

## 2019-08-02 NOTE — PROGRESS NOTES
Pt pleasant and calm throughout shift  Medication changes reviewed  Pt interested in marriage counseling with wife  Visible in milieu and attending groups

## 2019-08-02 NOTE — CASE MANAGEMENT
CM contacted Pt's wife, Emily Pearson, @ [phone] who said that Pt told her that he had something wrong with his kidney & he should not have left [employer] come here, but rather be medically admitted  CM reviewed that per the H&P there was acute injury from the tylenol injury & they would monitor Pt's labs, but he was medically stable at this time  Emily Pearson said that was just an example of how she cannot believe anything Pt says  She said that one time he told her she left the stove on when she went to work, & the house filled with CO2 & the fire department had to come & air out the house, etc   Emily Pearson said that Pt didn't know that their daughter was home & in her room upstairs & she said none of that happened  Migelbeatriz Salcedogiovany said that a few weeks ago she told Pt that she was going to try to move her mom closer to them, in [place]  Emily Pearson said that about a week & a half ago, she told Pt that she found & place & her mom was to move in on August 1  Emily Pearson said that ever since she told Pt this, his behavior has become even more erratic  She said that he has high points when he is up, runing on his treadmill for hours, & seems to have an abundance of energy  Emily Pearson said that on Fri she told Pt she was leaving early Sat morning to go to [address] he asked ot go with her, saying he had all this engery & he was talking rapidly  Emily Pearson said that she told him no & that she planned to be back on Saturday  Emily Pearson said that she ended up staying until Sunday, & he was upset that she didn't come back Sat  Emily Pearson said that on Tues(8/30) she needed Pt to get their other car inspected & he got to the automechanic late & wasn't able to get the car inspected  Emily Pearson said that Pt started texted her that he was sorry that he always disappointments her  She said no worries, she will get it done another time  She said that she should've known better than to try to get her car done at the end of the month, & he sent a little sad face emoji    On Weds at 5:41 AM is when he started to send her messages about he was sorry  Alison Gupta reported that Pt did try to overdose at home once before  She said that Pt had injured his arm Halloween of 2016, & had to have surgery around Thanksgiving of 2016  She said that Pt seemed really down, & the doctor had given him heavy narcotics  Alison Alonso said that she had no idea he had done anything, but he told someone online what he had one  This person was in [address] they called the [place] police & they called [place] police  , Alison Gupta said she had no clue, until the 's office was knocking on the door  Alison Gupta said that Pt seems to get hypomanic once/twice per month, but is more depressed most of the time  Alison Gupta said that she knows that this is not a good time for him, & his frustrations with being laid off from [employer] over a year  She said that she knows he feels like he is not contributing but he is trying it just isn't working out  Alison Gupta said that Pt stays in master bedroom with the dog & they only come down once or twice/day to eat & let the dog out; most of the time they don't see them  Alison Gupta said that she is glad Pt is getting help & she is open to see a therapist if that is what he wants  Jennifer expressed concern that Pt last night was still stating that it is his body & he has a right to do that(comit suicide)    Alison Gupta said that this scares her & CM agreed to inform Tx Team

## 2019-08-02 NOTE — PLAN OF CARE
Problem: Ineffective Coping  Goal: Participates in unit activities  Description  Interventions:  - Provide therapeutic environment   - Provide required programming   - Redirect inappropriate behaviors   Outcome: Progressing     Problem: SELF HARM/SUICIDALITY  Goal: Will have no self-injury during hospital stay  Description  INTERVENTIONS:  - Q 15 MINUTES: Routine safety checks  - Q WAKING SHIFT & PRN: Assess risk to determine if routine checks are adequate to maintain patient safety  - Encourage patient to participate actively in care by formulating a plan to combat response to suicidal ideation, identify supports and resources  Outcome: Progressing     Problem: DEPRESSION  Goal: Will be euthymic at discharge  Description  INTERVENTIONS:  - Administer medication as ordered  - Provide emotional support via 1:1 interaction with staff  - Encourage involvement in milieu/groups/activities  - Monitor for social isolation  Outcome: Progressing     Problem: SLEEP DISTURBANCE  Goal: Will exhibit normal sleeping pattern  Description  Interventions:  -  Assess the patients sleep pattern, noting recent changes  - Administer medication as ordered  - Decrease environmental stimuli, including noise, as appropriate during the night  - Encourage the patient to actively participate in unit groups and or exercise during the day to enhance ability to achieve adequate sleep at night  - Assess the patient, in the morning, encouraging a description of sleep experience  Outcome: Progressing     Problem: Nutrition/Hydration-ADULT  Goal: Nutrient/Hydration intake appropriate for improving, restoring or maintaining nutritional needs  Description  Monitor and assess patient's nutrition/hydration status for malnutrition (ex- brittle hair, bruises, dry skin, pale skin and conjunctiva, muscle wasting, smooth red tongue, and disorientation)  Collaborate with interdisciplinary team and initiate plan and interventions as ordered    Monitor patient's weight and dietary intake as ordered or per policy  Utilize nutrition screening tool and intervene per policy  Determine patient's food preferences and provide high-protein, high-caloric foods as appropriate       INTERVENTIONS:  - Monitor oral intake, urinary output, labs, and treatment plans  - Assess nutrition and hydration status and recommend course of action  - Evaluate amount of meals eaten  - Assist patient with eating if necessary   - Allow adequate time for meals  - Recommend/ encourage appropriate diets, oral nutritional supplements, and vitamin/mineral supplements  - Order, calculate, and assess calorie counts as needed  - Recommend, monitor, and adjust tube feedings and TPN/PPN based on assessed needs  - Assess need for intravenous fluids  - Provide specific nutrition/hydration education as appropriate  - Include patient/family/caregiver in decisions related to nutrition  Outcome: Progressing

## 2019-08-02 NOTE — PROGRESS NOTES
Pt awake just before 0600 and asking to shower  Pt reports poor sleep, states being up and down throughout night  Reports continued nightmares, minipress ineffective tonight  Pt is pleasant, calm

## 2019-08-02 NOTE — PROGRESS NOTES
Patient pleasant and cooperative during evening shift and interaction  Encouraged to attend wrap up group, pt engaged and appropriate  Reports constant nightmares d/t PTSD, was in [employer] for 8 years  Denies SI/HI/AVH, states, "I told the nurses today that I will be safe here, I am here to get better "  Contracts for safety  Pt reports struggling with meals today, stating, "I am not trying to be difficult, I want to eat, but the food is terrible and I am struggling to get it down " Writer validated this concern and encouraged pt to use menu for tomorrow's meals to offer more variety  Compliant with medications, and currently in bed sleeping

## 2019-08-02 NOTE — MALNUTRITION/BMI
This medical record reflects one or more clinical indicators suggestive of morbid obesity  BMI Findings:  BMI Classifications: Morbid Obesity 45-49 9     Body mass index is 46 71 kg/m²  See Nutrition note dated 8/2/19 for additional details  Completed nutrition assessment is viewable in the nutrition documentation

## 2019-08-03 PROCEDURE — 99232 SBSQ HOSP IP/OBS MODERATE 35: CPT | Performed by: PSYCHIATRY & NEUROLOGY

## 2019-08-03 RX ORDER — PRAZOSIN HYDROCHLORIDE 1 MG/1
2 CAPSULE ORAL
Status: DISCONTINUED | OUTPATIENT
Start: 2019-08-03 | End: 2019-08-04

## 2019-08-03 RX ADMIN — SERTRALINE HYDROCHLORIDE 50 MG: 50 TABLET ORAL at 09:02

## 2019-08-03 RX ADMIN — TRAZODONE HYDROCHLORIDE 50 MG: 50 TABLET ORAL at 21:27

## 2019-08-03 RX ADMIN — PRAZOSIN HYDROCHLORIDE 2 MG: 1 CAPSULE ORAL at 21:26

## 2019-08-03 NOTE — PROGRESS NOTES
Pt awake at start of shift  Pt reports waking up 7 times overnight and feels medications are not working  Pt showered and attended to ADLs  Pleasant in conversation  Denies SI  Reports improving depression  Visible in milieu  Reports he has been attending groups on the unit  Had good visit with wife last evening  No questions/concerns

## 2019-08-03 NOTE — PROGRESS NOTES
Pt calm and cooperative, pleasant during interaction  Attending groups and visible on unit throughout shift  Denies any needs or questions  Pt is consenting for safety on unit  Positive visit with wife this evening

## 2019-08-03 NOTE — PROGRESS NOTES
Belongings Brought in during visit with wife: At bedside:  Martha  2 shirts  Deodorant  Body wash  Pants  Wash cloth    Locker:   Towel

## 2019-08-03 NOTE — PROGRESS NOTES
Daily rounds  Reason for admission reviewed  Pt has been more hopeful, looking forward to marriage counseling, and currently denies SI  Pt reports awakening frequently overnight, feels medications are not working

## 2019-08-03 NOTE — PROGRESS NOTES
Pt pleasant during conversation  Visible in milieu throughout the shift  Social with peers in the day room  Attended groups today with appropriate interaction noted  Denies SI/HI/AVH  Depression and anxiety are improving  Medication compliant  Pt has no questions/concerns regarding medications

## 2019-08-03 NOTE — PROGRESS NOTES
Erasmo Lopez  was seen for continuing care and reviewed with staff  Progress Note - Behavioral Health   Erasmo Lopez 38 y o  male MRN: @MRN   Unit/Bed#: [facility]ine 809-90 Encounter: [id number]      Report from staff regarding this patient received and discussed, and records reviewed prior to seeing this patient   Nursing report that patient did not sleep well last night, had nightmares waked up 6 times  Today, patient said that he still had nightmares, bothering his sleep, feels tired at daytime, no flashbacks, his mood is calm, no negative thoughts, he feels hopeful and had a positive plan after his discharge, his wife visited his yesterday , went well  Yesterday he heard music briefly but nobody else heard it  Sleep: poor with nightmares  Appetite: fair    Medication side effects: tired    Mental Status Evaluation:    Appearance:  Well groom   Behavior:  Cooperative, fair eye contact   Mood:  " I am OK'   Affect: full   Speech:  Clear fluent   Thought Process:  Goal oriented   Thought Content:  No delusions, no paranoid thoughts   Perceptual Disturbances:  No hallucinations   Risk Potential: Agreed on safety contract   Sensorium:  intact   Memory:  Recent memory intact   Consciousness:  X 3   Insight:  Intact, understand the symptoms and stressors   Judgment: Fair, taking medications   Motor Activity: No hand tremors or EPS noticed         Laboratory results:  I have personally reviewed all pertinent laboratory results      BMP:   Recent Labs     08/01/19  1200   K 4 0      CO2 25   BUN 11     Vitals:    08/03/19 0747   BP: 120/84   Pulse: 89   Resp: 17   Temp: 97 9 °F (36 6 °C)   SpO2: 99%        Assessment/Plan   PTSD, Major depression    Recommended Treatment:     Planned medication and treatment changes:    Patient still has nightmares bothering his sleep, feeling tired due to poor sleep, no other complaints, will increase Prazosin to 2mg QHS for nightmares, continue other meds, monitor mood and behaviors         Saul Machado MD  [phone]

## 2019-08-04 PROCEDURE — 99232 SBSQ HOSP IP/OBS MODERATE 35: CPT | Performed by: PSYCHIATRY & NEUROLOGY

## 2019-08-04 RX ORDER — PRAZOSIN HYDROCHLORIDE 1 MG/1
3 CAPSULE ORAL
Status: DISCONTINUED | OUTPATIENT
Start: 2019-08-04 | End: 2019-08-05

## 2019-08-04 RX ORDER — IBUPROFEN 400 MG/1
400 TABLET ORAL EVERY 6 HOURS PRN
Status: DISCONTINUED | OUTPATIENT
Start: 2019-08-04 | End: 2019-08-07 | Stop reason: HOSPADM

## 2019-08-04 RX ADMIN — IBUPROFEN 400 MG: 400 TABLET ORAL at 10:46

## 2019-08-04 RX ADMIN — PRAZOSIN HYDROCHLORIDE 3 MG: 1 CAPSULE ORAL at 21:36

## 2019-08-04 RX ADMIN — TRAZODONE HYDROCHLORIDE 50 MG: 50 TABLET ORAL at 21:37

## 2019-08-04 RX ADMIN — SERTRALINE HYDROCHLORIDE 50 MG: 50 TABLET ORAL at 09:02

## 2019-08-04 NOTE — PROGRESS NOTES
Pt is pleasant and polite during interaction  Visible on unit  Attends groups  Reports waking up 7 times last night  Pt received PRN Motrin for HA

## 2019-08-04 NOTE — PROGRESS NOTES
Daily Rounds:    Avilaies SI  Attends groups  Prazosin increased last night, appeared to have improved sleep

## 2019-08-04 NOTE — PROGRESS NOTES
Natalio Yin  was seen for continuing care and reviewed with staff  Progress Note - Behavioral Health   Natalio Yin 38 y o  male MRN: @MRN   Unit/Bed#: Asia Myers 466-21 Encounter: [id number]      Report from staff regarding this patient received and discussed, and records reviewed prior to seeing this patient   Nursing report that patient seems sleeping alright, attend group, friendly, some anxiety, asked Motrin for headache  Today, patient said that he still had nightmare last night, waked up a few times, agreed to increase Prazosin dosage, his mood is ok today, some anxiety, no negative thoughts  Sleep: nightmares  Appetite: fair    Medication side effects: slight headache    Mental Status Evaluation:    Appearance:  Well groom, overweight   Behavior:  Cooperative, fair eye contact   Mood:  " I am calm now'   Affect: full   Speech:  Clear fluent   Thought Process:  Goal oriented   Thought Content:  No delusions, no paranoid thoughts   Perceptual Disturbances:  No hallucinations   Risk Potential: Agreed on safety contract   Sensorium:  intact   Memory:  Recent memory intact   Consciousness:  X 3   Insight:  Intact, understand the symptoms and stressors   Judgment: Fair, taking medications   Motor Activity: No hand tremors or EPS noticed         Laboratory results:  I have personally reviewed all pertinent laboratory results  BMP: No results for input(s): NA, K, CL, CO2, BUN in the last 72 hours      Invalid input(s): CREA, GLU  Vitals:    08/04/19 0812   BP: 134/86   Pulse: 98   Resp: 18   Temp: 98 1 °F (36 7 °C)   SpO2:         Assessment/Plan   Principal Problem:    Severe episode of recurrent major depressive disorder, without psychotic features (HCC)  Active Problems:    PTSD (post-traumatic stress disorder)    Generalized anxiety disorder      Recommended Treatment:     Planned medication and treatment changes:    Patient still has nightmares bothering his sleep, headache released by Motrin,  will increase Prazosin to 3mg QHS for nightmares, continue other meds, monitor mood and behaviors           Oretha Kehr MD  [phone]

## 2019-08-05 PROCEDURE — 99232 SBSQ HOSP IP/OBS MODERATE 35: CPT | Performed by: STUDENT IN AN ORGANIZED HEALTH CARE EDUCATION/TRAINING PROGRAM

## 2019-08-05 RX ORDER — TRAZODONE HYDROCHLORIDE 100 MG/1
100 TABLET ORAL
Status: DISCONTINUED | OUTPATIENT
Start: 2019-08-05 | End: 2019-08-07 | Stop reason: HOSPADM

## 2019-08-05 RX ORDER — PRAZOSIN HYDROCHLORIDE 1 MG/1
2 CAPSULE ORAL
Status: DISCONTINUED | OUTPATIENT
Start: 2019-08-05 | End: 2019-08-07 | Stop reason: HOSPADM

## 2019-08-05 RX ADMIN — PRAZOSIN HYDROCHLORIDE 2 MG: 1 CAPSULE ORAL at 21:08

## 2019-08-05 RX ADMIN — TRAZODONE HYDROCHLORIDE 100 MG: 100 TABLET ORAL at 21:08

## 2019-08-05 RX ADMIN — SERTRALINE HYDROCHLORIDE 50 MG: 50 TABLET ORAL at 08:10

## 2019-08-05 NOTE — PROGRESS NOTES
Pt reports improved symptoms but states he is waking up frequently overnight, reports he marks a paper when he wakes up to keep track of it, reports he had one nightmare/flashback that he remembers but that he often wakes up due to nightmares that he cannot remember  Pt was encouraged to notify night shift staff when he is unable to fall back to sleep, also informed pt that he does have a Trazodone PRN available in addition to his scheduled medications  Pt denies SI/HI  Pt is hopeful that if his sleep improves, it will help decrease his irritability and therefore help with his marital issues  Pleasant in conversation, out in milieu throughout shift, cooperative

## 2019-08-05 NOTE — DISCHARGE INSTR - OTHER ORDERS
Included in discharge folder is information on [facility] Depression/Bipolar support group which meets weekly in [place](free)  Included in discharge folder is information on groups at the [facility]  The mental health clinic phone number is [phone]  The PEER LINE is a toll-free telephone number for people in [facility] who are seeking a listening ear for additional support in their recovery from mental illness  The PEER LINE is peer-run and peer-friendly  You can call the Peer Line 24 hours a day  Phone: 7-527-GT-PEERS /([phone])     Emergency [facility] Emergency Services: [phone]  [address]  [address] [facility], [address] [place] [address]     Text CONNECT to 057308 from anywhere in the [place], anytime, about any type of crisis  A live, trained Crisis Counselor receives the text and lets you know that they are here to listen  The volunteer Crisis Counselor will help you move from a hot moment to a cool moment  Warm Line: [phone], [phone], [phone]  If it is not quite a crisis, but you want to talk to someone, 24 hours/day, 7 days/week:  Someone to listen; someone who cares  The [facility] on Mental Illness ([facility]) offers various education & support groups for you & your family  For more information visit their website at http://www Zebtab/     Dial 2-1-1 to get connected/get help  Free, confidential information & referral available 24/7: Aging Services, Child & Youth Services, Counseling, Education/Training, Food/Shelter/Clothing, Health Services, Parenting, Substance Abuse, Support Groups, Volunteer Opportunities, & much more    Phone: 2-1-1 or 839-069-6866, Web: SHIRA [email or web address] AVELINA, Email: [email or web address]

## 2019-08-05 NOTE — PROGRESS NOTES
Provided patient with education on Prazosin, Trazodone and Tips of Healthy Sleep  Encouraged patient to discuss any questions/concerns he may have after reviewing information

## 2019-08-05 NOTE — PROGRESS NOTES
08/05/19 0846   Team Meeting   Meeting Type Daily Rounds   Initial Conference Date 08/05/19   Team Members Present   Team Members Present Physician;Nurse;   Physician Team Member Dr Bambi Marie Team Member [facility] Management Team Member Hugo Arita   Patient/Family Present   Patient Present No   Patient's Family Present No   Chart and labs were reviewed  Medications to be monitored  Pending discharge for Thursday 8/8

## 2019-08-05 NOTE — PROGRESS NOTES
Progress Note - Behavioral Health   Joe Laguerre 38 y o  male MRN: [id number]  Unit/Bed#: U 251-02 Encounter: [id number]    Assessment/Plan   Principal Problem:    Severe episode of recurrent major depressive disorder, without psychotic features ([facility] [address] )  Active Problems:    PTSD (post-traumatic stress disorder)    Generalized anxiety disorder    Recommended Treatment:   Continue sertraline 50 mg daily for management of depression  Increase trazodone, 100 mg nightly for management of insomnia  Decrease Minipress, 2 mg nightly for management of PTSD-related flashbacks and nightmares  Continue with group therapy, milieu therapy and occupational therapy  Risks, benefits and possible side effects of Medications: Risks, benefits, and possible side effects of medications have been explained to the patient, who verbalizes understanding  Progress Toward Goals: slow improvement    ------------------------------------------------------------    Subjective: Isac Escobar has been compliant with medications  he reports poor sleep last night with nightmares, as well as flashbacks to his time in the [employer] on waking  He feels his sleep has not improved since his admission and states that he thinks his sleep last night was actually worse than it had been previously  Patient reports he has been struggling with insomnia for nearly 30 years and is hesitant to take hypnotics due to side effects  He states he refused p r n  Sleep medication early this morning because he felt he would be having daytime drowsiness and because he was having stomach upset at that time  He also endorses a morning headache, though he denies dizziness  His blood pressure is being monitored and has been within normal limits  Patient states he wants to go home, reporting he feels caged in   He requests reading materials regarding his medications and sleep hygiene to occupy his time  Patient denies any suicidal thoughts   Patient is consenting for safety on the unit  Psychiatric Review of Systems:  Behavior over the last 24 hours:  improved  Sleep: insomnia, frequent awakenings, early awakening, nightmares and difficulty falling asleep  Appetite: adequate  Medication side effects:  Headache  ROS: Occasional Indigestion    Current Medications:    Current Facility-Administered Medications:  aluminum-magnesium hydroxide-simethicone 30 mL Oral Q4H PRN Cort Nyhan, MD   benztropine 1 mg Intramuscular Q6H PRN Cort Nyhan, MD   benztropine 1 mg Oral Q6H PRN Cort Nyhan, MD   haloperidol 5 mg Oral Q6H PRN Cort Nyhan, MD   hydrOXYzine HCL 25 mg Oral Q6H PRN Cort Nyhan, MD   ibuprofen 400 mg Oral Q6H PRN Falguni Cornelius MD   magnesium hydroxide 30 mL Oral Daily PRN Cort Nyhan, MD   phenazopyridine 100 mg Oral Q8H PRN Eleazar Lilly   prazosin 2 mg Oral HS Genia Shane MD   risperiDONE 1 mg Oral Q6H PRN Cort Nyhan, MD   sertraline 50 mg Oral Daily Genia Shane MD   traZODone 100 mg Oral HS Genia Shane MD   traZODone 50 mg Oral HS PRN Cort Nyhan, MD       Behavioral Health Medications: all current active meds have been reviewed  Changes as above  Vital signs in last 24 hours:  Temp:  [98 5 °F (36 9 °C)] 98 5 °F (36 9 °C)  HR:  [77-89] 89  Resp:  [18] 18  BP: (125-155)/(78-86) 155/86    Laboratory results:  I have personally reviewed all pertinent laboratory/tests results  No results found for this or any previous visit (from the past 48 hour(s))       Mental Status Evaluation:  Appearance:  age appropriate, dressed appropriately, looks stated age, overweight  sitting comfortably in chair, adequate hygiene and grooming, cooperative with interview, fair eye contact   Behavior:  cooperative   Speech:  normal rate, normal volume, normal pitch, fluent, clear and coherent   Mood:  depressed   Affect:  constricted and mood-congruent   Language Within normal limits   Thought Process:  organized, goal directed, linear, normal rate of thoughts, normal abstract reasoning    Thought Content:  no overt delusions   Perceptual Disturbances: None   Risk Potential: Suicidal Ideations none, Homicidal Ideations none and Low potential for aggression, intermittent passive death wishes   Sensorium:  person, place, time and current situation   Cognition & Memory:  Grossly intact   Consciousness:  alert and awake   Attention: attention span and concentration were age appropriate   Insight:  Good   Judgment: [facility] appears to be of average intelligence   Gait/Station: normal gait/station   Motor Activity: no abnormal movements       This note has been constructed using a voice recognition system  There may be translation, syntax,  or grammatical errors  If you have any questions, please contact the dictating provider

## 2019-08-05 NOTE — PROGRESS NOTES
Pt is calm and cooperative  Attends groups, sociable with peers, visible on unit  Denies SI, HI, AVH  Reports some anxiety about discharge plans (pt's close peers are leaving before him)  Reports that he is happy with his new medication changes (increased Trazadone and decreased Minipress) and that groups have been helpful  Pt provided with medication and insomnia teaching  Denies any other concerns or questions at the time  Tentative discharge for Thursday

## 2019-08-05 NOTE — PROGRESS NOTES
Pt is pleasant and polite during interaction  Visible on unit and social with peers  Reports doing "well ' He is hopeful that the adjustment in sleep medication will be effective tonight  Currently playing dominos in dayroom  States wife will be visiting soon

## 2019-08-05 NOTE — PROGRESS NOTES
Pt awake just before 0300, walking halls  Offered PRN for sleep, declined  Pt appeared to be asleep prior to waking  Was in halls approximately 45 minutes and returned to room, sitting in chair or standing by [place] mirror  Staff opened Day Room for pt

## 2019-08-06 PROCEDURE — 99232 SBSQ HOSP IP/OBS MODERATE 35: CPT | Performed by: STUDENT IN AN ORGANIZED HEALTH CARE EDUCATION/TRAINING PROGRAM

## 2019-08-06 RX ORDER — TRAZODONE HYDROCHLORIDE 100 MG/1
100 TABLET ORAL
Qty: 30 TABLET | Refills: 0 | Status: SHIPPED | OUTPATIENT
Start: 2019-08-06 | End: 2019-08-07

## 2019-08-06 RX ORDER — PRAZOSIN HYDROCHLORIDE 2 MG/1
2 CAPSULE ORAL
Qty: 30 CAPSULE | Refills: 0 | Status: SHIPPED | OUTPATIENT
Start: 2019-08-06 | End: 2019-09-10

## 2019-08-06 RX ADMIN — TRAZODONE HYDROCHLORIDE 100 MG: 100 TABLET ORAL at 22:08

## 2019-08-06 RX ADMIN — PRAZOSIN HYDROCHLORIDE 2 MG: 1 CAPSULE ORAL at 22:08

## 2019-08-06 RX ADMIN — SERTRALINE HYDROCHLORIDE 50 MG: 50 TABLET ORAL at 09:24

## 2019-08-06 RX ADMIN — TRAZODONE HYDROCHLORIDE 50 MG: 50 TABLET ORAL at 23:27

## 2019-08-06 NOTE — PROGRESS NOTES
Progress Note - Behavioral Health   Fidel Lea 38 y o  male MRN: [id number]  Unit/Bed#: U 251-02 Encounter: [id number]    Assessment/Plan   Principal Problem:    Severe episode of recurrent major depressive disorder, without psychotic features ([facility] [address] )  Active Problems:    PTSD (post-traumatic stress disorder)    Generalized anxiety disorder    Recommended Treatment:   Continue sertraline 50 mg daily for management of depression  Continue trazodone, 100 mg nightly for management of insomnia; morning sedation may be secondary to increase trazodone dose, will continue to monitor  Continue Minipress, 2 mg nightly for management of PTSD-related flashbacks and nightmares  Continue with group therapy, milieu therapy and occupational therapy  Risks, benefits and possible side effects of Medications: Risks, benefits, and possible side effects of medications have been explained to the patient, who verbalizes understanding  Progress Toward Goals: slow improvement    ------------------------------------------------------------    Subjective: Rossi Reyes has been compliant with medications  he reports improved sleep last night  He states he sleeps from approximately 11:00 p m  To 6:00 a m  With only 3 awakenings, compared with approximately 8 with awakenings at night before  Patient reports feeling hazy this morning, which she feels is due to sleeping somewhat more than usual   Patient states he thinks he still needs to catch up on more sleep but that his sleep last night was much improved from baseline  His blood pressure is being monitored and has been within normal limits  Patient is hopeful for discharge and states that he plans to undergo both individual and marital counseling on discharge  Patient denies any suicidal thoughts  Patient is consenting for safety on the unit      Psychiatric Review of Systems:  Behavior over the last 24 hours:  improved  Sleep: insomnia and improved  Appetite: adequate  Medication side effects:  mild morning sedation  ROS: no complaints    Current Medications:    Current Facility-Administered Medications:  aluminum-magnesium hydroxide-simethicone 30 mL Oral Q4H PRN Rush Marie MD   benztropine 1 mg Intramuscular Q6H PRN Rush Marie MD   benztropine 1 mg Oral Q6H PRN Rush Marie MD   haloperidol 5 mg Oral Q6H PRN Rush Marie MD   hydrOXYzine HCL 25 mg Oral Q6H PRN Rush Marie MD   ibuprofen 400 mg Oral Q6H PRN Guero Waddell MD   magnesium hydroxide 30 mL Oral Daily PRN Rush Marie MD   phenazopyridine 100 mg Oral Q8H PRN Eleazar Lilly   prazosin 2 mg Oral HS Alean Hidden, MD   risperiDONE 1 mg Oral Q6H PRN Rush Marie MD   sertraline 50 mg Oral Daily Alean Hidden, MD   traZODone 100 mg Oral HS Alean Hidden, MD   traZODone 50 mg Oral HS PRN Rush Marie MD       Behavioral Health Medications: all current active meds have been reviewed  Changes as above  Vital signs in last 24 hours:  Temp:  [97 3 °F (36 3 °C)-98 6 °F (37 °C)] 97 3 °F (36 3 °C)  HR:  [] 77  Resp:  [18-20] 20  BP: (117-146)/(76-99) 117/76    Laboratory results:  I have personally reviewed all pertinent laboratory/tests results  No results found for this or any previous visit (from the past 48 hour(s))       Mental Status Evaluation:  Appearance:  age appropriate, dressed appropriately, looks stated age, overweight  sitting comfortably in chair, adequate hygiene and grooming, cooperative with interview, fair eye contact   Behavior:  cooperative   Speech:  normal rate, normal volume, normal pitch, fluent, clear and coherent   Mood:  dysthymic   Affect:  Brighter, constricted and mood-congruent   Language Within normal limits   Thought Process:  organized, goal directed, linear, normal rate of thoughts, normal abstract reasoning    Thought Content:  no overt delusions   Perceptual Disturbances: None   Risk Potential: Suicidal Ideations none, Homicidal Ideations none and Low potential for aggression, intermittent passive death wishes   Sensorium:  person, place, time and current situation   Cognition & Memory:  Grossly intact   Consciousness:  alert and awake   Attention: attention span and concentration were age appropriate   Insight:  Good   Judgment: 2018 [facility] appears to be of average intelligence   Gait/Station: normal gait/station   Motor Activity: no abnormal movements       This note has been constructed using a voice recognition system  There may be translation, syntax,  or grammatical errors  If you have any questions, please contact the dictating provider

## 2019-08-06 NOTE — BH TRANSITION RECORD
Contact Information: If you have any questions, concerns, pended studies, tests and/or procedures, or emergencies regarding your inpatient behavioral health visit  Please contact Please contact [place] behavioral health unit [phone] and ask to speak to a , nurse or physician  A contact is available 24 hours/ 7 days a week at this number  Summary of Procedures Performed During your Stay:  Below is a list of major procedures performed during your hospital stay and a summary of results:  - No major procedures performed  Pending Studies (From admission, onward)      None        If studies are pending at discharge, follow up with your PCP and/or referring provider

## 2019-08-06 NOTE — SOCIAL WORK
Worker discussed discharge planning with patient as he is pending discharge for 8/7  Patient signed FAUSTINO for [facility] in [facility]  Worker informed him that they have both therapy and psychiatry services  Worker also discussed after he completes intake and does individual therapy, couples counseling can be explored

## 2019-08-06 NOTE — PROGRESS NOTES
Pt pleasant during interaction  Pt states he will be discharging tomorrow and is happy about this  Pt feels that current medications are helping  Concerned about sleep and is hopeful that sleep will continue to improve  Discussed importance of attending outpatient appointment so that he can continue to see physician and have meds adjusted if needed  Reviewed scheduled medications  Pt asked appropriate questions  Pt understands current meds and why he is taking them  Pt continues to deny SI and contracts for safety

## 2019-08-06 NOTE — DISCHARGE INSTRUCTIONS
Depression   WHAT YOU NEED TO KNOW:   Depression is a medical condition that causes feelings of sadness or hopelessness that do not go away  Depression may cause you to lose interest in things you used to enjoy  These feelings may interfere with your daily life  DISCHARGE INSTRUCTIONS:   Call 911 for any of the following:   · You think about harming yourself or someone else  Contact your healthcare provider if:   · Your symptoms do not improve  · You cannot make it to your next appointment  · You have new symptoms  · You have questions or concerns about your condition or care  Medicines:   · Antidepressants  may be given to improve or balance your mood  You may need to take this medicine for several weeks before you begin to feel better  · Take your medicine as directed  Contact your healthcare provider if you think your medicine is not helping or if you have side effects  Tell him of her if you are allergic to any medicine  Keep a list of the medicines, vitamins, and herbs you take  Include the amounts, and when and why you take them  Bring the list or the pill bottles to follow-up visits  Carry your medicine list with you in case of an emergency  Therapy  may be used to treat your depression  A therapist will help you learn to cope with your thoughts and feelings  This can be done alone or in a group  It may also be done with family members or a significant other  Self-care:   · Get regular physical activity  Try to exercise for 30 minutes, 3 to 5 days a week  Work with your healthcare provider to develop an exercise plan that you enjoy  Physical activity may improve your symptoms  · Get enough sleep  Create a routine to help you relax before bed  You can listen to music, read, or do yoga  Try to go to bed and wake up at the same time every day  Sleep is important for emotional health  · Eat a variety of healthy foods from all of the food groups    A healthy meal plan is low in fat, salt, and added sugar  Ask your healthcare provider for more information about a meal plan that is right for you  · Do not drink alcohol or use drugs  Alcohol and drugs can make your symptoms worse  Follow up with your healthcare provider as directed: Your healthcare provider will monitor your progress at follow-up visits  He or she will also monitor your medicine if you take antidepressants  Your healthcare provider will ask if the medicine is helping  Tell him or her about any side effects or problems you may have with your medicine  The type or amount of medicine may need to be changed  Write down your questions so you remember to ask them during your visits  © 2017 [address] [employer] Information is for End User's use only and may not be sold, redistributed or otherwise used for commercial purposes  All illustrations and images included in CareNotes® are the copyrighted property of [employer]  or Norberto Alba  The above information is an  only  It is not intended as medical advice for individual conditions or treatments  Talk to your doctor, nurse or pharmacist before following any medical regimen to see if it is safe and effective for you

## 2019-08-06 NOTE — PROGRESS NOTES
08/06/19 3339   Team Meeting   Meeting Type Daily Rounds   Initial Conference Date 08/06/19   Team Members Present   Team Members Present Physician;Nurse;   Physician Team Member Dr Annette Pierre Team Member [address] [place] [address] Management Team Member Rafaela Cespedes   Patient/Family Present   Patient Present No   Patient's Family Present No   Chart and albs were reviewed  Patient slept better last evening after increased Trazodone dose  Patient pending discharge for 8/7

## 2019-08-06 NOTE — PROGRESS NOTES
Pt visible in milieu throughout shift  Social with peers and attending groups  No SI  Improving depression and anxiety  Sleep improved  No concerns at this time

## 2019-08-06 NOTE — DISCHARGE SUMMARY
Discharge Summary - [facility]  38 y o  male MRN: [id number]  Unit/Bed#: Rodena Koyanagi 991-42 Encounter: [id number]     Admission Date:   Admission Orders (From admission, onward)     Ordered        08/01/19 1043  DISCHARGE READMIT Admit Patient to [facility] (use with Discharge Readmit navigator and use ONLY if pt  is changing campus  Nurse to release order when pt  arrives to [address] [place] [address])  Once                         Discharge Date: 08/07/19    Attending Psychiatrist: Estela Stockton    Reason for Admission:   Hemanth Clarke is a 38 y o  male who presents following suicide attempt on over-the-counter medications  Hemanth Clarke reported uncontrolled depression and PTSD with symptoms including hopelessness, helplessness, guilt, flashbacks, nightmares, chronic insomnia, decreased appetite, anhedonia, weight gain, and continued suicidal ideation  he was admitted to the psychiatric unit on a voluntarily 201 commitment basis  Please see initial H&P for full details  Hospital Course: On admission, Hemanth Clarke was started on sertraline 25 mg daily for management of depression and Minipress 1 mg at bedtime for management of nightmares  his medications were titrated as appropriate, including increasing sertraline to 50 mg daily  Prazosin was increased to 3 mg due to continued nightmares but decreased to 2 mg due to concern for rebound hypertension  he was also started on trazodone 50 mg nightly for insomnia, which was gradually increased to 150 mg nightly with good response  he tolerated these medications with no major side effects  The patient's mood brightened over the course of treatment, and he was seen in [facility] interacting appropriately with peers  Hemanth Clarke did not demonstrate dangerous behavior to self or others during his inpatient stay  Hemanth Clarke denied suicidal or homicidal ideations at the time of his discharge  Labs/Imaging:   I have personally reviewed all pertinent laboratory/tests results    Most Recent Labs: Lab Results   Component Value Date    WBC 6 60 08/01/2019    RBC 5 68 (H) 08/01/2019    HGB 16 2 08/01/2019    HCT 50 5 (H) 08/01/2019     08/01/2019    RDW 13 5 08/01/2019    NEUTROABS 2 39 08/01/2019    SODIUM 142 08/01/2019    K 4 0 08/01/2019     08/01/2019    CO2 25 08/01/2019    BUN 11 08/01/2019    CREATININE 1 40 (H) 08/01/2019    GLUC 84 08/01/2019    CALCIUM 8 8 08/01/2019    AST 12 08/01/2019    ALT 23 08/01/2019    ALKPHOS 73 08/01/2019    TP 7 5 08/01/2019    ALB 3 6 08/01/2019    TBILI 0 40 08/01/2019    CHOLESTEROL 181 08/01/2019    HDL 40 08/01/2019    TRIG 50 08/01/2019    LDLCALC 131 (H) 08/01/2019    NONHDLC 141 08/01/2019    GQJ4UAYGPDAP 1 548 08/01/2019    RPR Non-Reactive 08/01/2019       Mental Status at time of Discharge:   Appearance:  alert, casually dressed, appears stated age, appropriate grooming and hygiene and obese   Behavior:  cooperative, pleasant, sitting comfortably in chair, good eye contact, no abnormal movements and normal gait and balance   Speech:  spontaneous, normal rate, normal volume and clear    Mood:  euthymic   Affect:  mood-congruent   Language Within normal limits   Thought Process:  organized, logical, goal directed, normal rate of thoughts   Thought Content:  No verbalized delusions   Perceptual Disturbances: None    Risk Potential: No passive or active suicidal or homicidal ideation, intent, or plan     Sensorium:  person, place, time and current situation   Cognition:  Grossly intact   Consciousness:  alert and awake   Attention: attention span and concentration were age appropriate   Insight:  good   Judgment: good   Intellect appears to be of average intelligence   Gait/Station: normal gait/station and normal balance   Motor Activity: no abnormal movements       Discharge Diagnosis:   Patient Active Problem List   Diagnosis    Biceps rupture, distal, right, sequela    Severe episode of recurrent major depressive disorder, without psychotic features ([facility] [address] )    PTSD (post-traumatic stress disorder)    Generalized anxiety disorder       Discharge Medications:  See list above, as well as the after visit summary containing reconciled discharge medications provided to patient and family  Discharge instructions/Information to patient and family:   See after visit summary for information provided to patient and family  Provisions for Follow-Up Care:  See after visit summary for information related to follow-up care and any pertinent home health orders  This note has been constructed using a voice recognition system  There may be translation, syntax,  or grammatical errors  If you have any questions, please contact the dictating provider

## 2019-08-06 NOTE — SOCIAL WORK
Worker called patient's wife Joan Solomon to inform her of discharge on 8/7  Worker asked if she had any questions or concerns at this time, she reported no  She reports that patient is better prior to admission  She did ask about sleep medication, worker informed her that patient did sleep better last evening after a medication adjustment  She will pick him up for discharge on 8/7 later in the afternoon  She was unable to give worker a specific time due to her work schedule

## 2019-08-07 VITALS
SYSTOLIC BLOOD PRESSURE: 141 MMHG | RESPIRATION RATE: 17 BRPM | BODY MASS INDEX: 42.66 KG/M2 | WEIGHT: 315 LBS | HEIGHT: 72 IN | TEMPERATURE: 97.3 F | HEART RATE: 70 BPM | OXYGEN SATURATION: 97 % | DIASTOLIC BLOOD PRESSURE: 80 MMHG

## 2019-08-07 PROCEDURE — 99238 HOSP IP/OBS DSCHRG MGMT 30/<: CPT | Performed by: STUDENT IN AN ORGANIZED HEALTH CARE EDUCATION/TRAINING PROGRAM

## 2019-08-07 RX ORDER — TRAZODONE HYDROCHLORIDE 50 MG/1
150 TABLET ORAL
Qty: 90 TABLET | Refills: 0 | Status: SHIPPED | OUTPATIENT
Start: 2019-08-07 | End: 2019-09-10

## 2019-08-07 RX ADMIN — SERTRALINE HYDROCHLORIDE 50 MG: 50 TABLET ORAL at 08:54

## 2019-08-07 NOTE — PROGRESS NOTES
Status:Pt pleasant & cooperative, ready for d/c  Medication: no changes  D/C: Today     08/07/19 0719   Team Meeting   Meeting Type Daily Rounds   Team Members Present   Team Members Present Physician;Nurse;   Physician Team Member Dr Ramona Joshi / Letitia Gonzalez Team Member Jelena Kemp / Glennette Fabry Management Team Member [id number] / Valere Schlatter   Patient/Family Present   Patient Present No   Patient's Family Present No

## 2019-08-07 NOTE — PROGRESS NOTES
Pt assessed at start of shift, sitting in dayroom watching TV  States he is being discharged today and wife will be picking him up once d/c time arranged  Pt hopes to follow up with the [place] in his area, Meghan Godinez  Reports improved sleep which was the main problem r/t PTSD  Pt states he learned a lot here and knows how to continue to work on his sleep hygiene  States his wife is a  also and they both have issues to work on  Pt reports readiness for d/c today, waiting to see CM and doctor   Denies SI

## 2019-08-07 NOTE — CASE MANAGEMENT
CM met with Pt who verbalized readiness for discharge  Pt stated that he has had positive conversations with his wife & they plan to work together moving forward  Pt wanted to share a story about his grandfather & a postcard that Pt wrote to his past self  Pt said that he is unsure if the antidepressant is helping or if it's more because he has had positive conversations with his wife  Pt said that medication has helped with sleep & last night was the first night that he slept the entire night, straight through; Pt said that he keeps a paper on his nightstand & when he wakes up overnight he puts a little tick on it, so he would know how many times he awoke over night  CM reviewed Pt's appointment at [facility] clinic & the information provided on [facility] Depression/Bipolar Support group  Pt said that he is interested in this & has talked with his wife about getting connected with the [place]  CM agreed to provide a listing of meetings held that the [place] & that Pt should call first to confirm the dates/times  Pt had no questions about his d/c     CM returned a phone call to Pt's wife, Janny Main, @ [phone] & she confirmed she could provide transportation at 12 PM today  CM reviewed with her Pt's follow-up at [address] provided  Janny Main had no questions about Pt's discharge

## 2019-09-10 ENCOUNTER — HOSPITAL ENCOUNTER (EMERGENCY)
Facility: HOSPITAL | Age: 44
Discharge: HOME | End: 2019-09-10
Attending: EMERGENCY MEDICINE
Payer: COMMERCIAL

## 2019-09-10 VITALS
OXYGEN SATURATION: 96 % | BODY MASS INDEX: 46.11 KG/M2 | WEIGHT: 315 LBS | TEMPERATURE: 98.2 F | HEART RATE: 86 BPM | RESPIRATION RATE: 18 BRPM | SYSTOLIC BLOOD PRESSURE: 119 MMHG | DIASTOLIC BLOOD PRESSURE: 74 MMHG

## 2019-09-10 DIAGNOSIS — F32.A DEPRESSION: Primary | ICD-10-CM

## 2019-09-10 PROCEDURE — 99284 EMERGENCY DEPT VISIT MOD MDM: CPT

## 2019-09-10 PROCEDURE — 99284 EMERGENCY DEPT VISIT MOD MDM: CPT | Performed by: EMERGENCY MEDICINE
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
## 2019-09-10 NOTE — ED PROVIDER NOTES
History  Chief Complaint   Patient presents with    Psychiatric Evaluation     from [facility] via EMS for psych evaluation     Patient is a 50-year-old male who presents to the emergency department having been transported by EMS from [facility] where he was attending a counseling session  He relates that he was talking with the same provider he has seen for a few sessions since in inpatient psychiatric admission at the beginning of August   He stated that things are not getting any better    He then explains that just because the having had progress so far does not mean I want feel better tomorrow    He tells me very specifically that I am hopeful that it will get better   He reports compliance with all medications  He relates that he has not missed any appointments  He indicates that he has an upcoming appointment for he and his wife do tend to session together  He relates that he thinks the provider today over considered what he stated  He relates that the provider asked him if he had ever attended a program where he goes during the day and comes home in the evening  He indicated that he had not and he relates that the provider indicated that they with step out to get information on this for him  Patient indicated that he was receptive to information on a partial hospitalization program   Upon return the provider indicated that they thought the patient should come to the emergency department for an evaluation by Psychiatry and possible emergency medication adjustment  Patient indicated that he was agreeable to do this and relays I will try anything    He expresses concern however though that things may have been interpreted incorrectly  He notes that his wife was very tearful at the appointment after recommendation and the patient come to the hospital   Patient relates that he has been diagnosed with severe depression and PTSD    He indicates that a portion of his PTSD is related to participation in the [facility] though in fact he experienced childhood traumas which likely triggered this  The patient relates that he has had extremely poor sleep since childhood  He typically gets 1 to 3 hours of sleep in a 24 hour period despite varied medications to try and improve this  He relates that often on weekends he will sleep for a longer period of time  He notes that often the thoughts going through his head/stress/anxiety are what keep him up  He relates that he always takes a bit of food in the morning with his medications and typically eats a 2nd meal later in the day  He notes that some days recently he has consumed only 1 meal a day  He relates that he does occasionally feel nauseous from the anxiety  He did briefly have palpitations a few days ago though believes that this was from his anxiety  He has unintentionally lost 29 lb since the beginning of his hospitalization in early August and shares that the particular pair of pants he is wearing now have not fit in 9 years  He vehemently denies ever having had thoughts of harming anyone other than himself  He denies having had the thoughts of harming himself since hospitalization in early August after which he overdosed on medications  Prior to Admission Medications   Prescriptions Last Dose Informant Patient Reported?  Taking?   prazosin (MINIPRESS) 2 mg capsule   No Yes   Sig: Take 1 capsule (2 mg total) by mouth daily at bedtime for 30 days   sertraline (ZOLOFT) 50 mg tablet   No Yes   Sig: Take 1 tablet (50 mg total) by mouth daily for 30 days   traZODone (DESYREL) 50 mg tablet   No Yes   Sig: Take 3 tablets (150 mg total) by mouth daily at bedtime for 30 days      Facility-Administered Medications: None       Past Medical History:   Diagnosis Date    Asthma     Depression     Fractures     Head injury     Psychiatric illness     PTSD (post-traumatic stress disorder)     Sleep difficulties     Suicide attempt ([facility] [address] ) Past Surgical History:   Procedure Laterality Date    COCCYX FRACTURE SURGERY      IA REINSERT BI/TRICEPS TENDON,DISTAL Right 11/22/2016    Procedure: DISTAL BICEPS REPAIR;  Surgeon: Justyna Hannon MD;  [facility]: [facility];  Service: Orthopedics       Family History   Problem Relation Age of Onset    Cancer Mother     Glaucoma Maternal Grandmother      I have reviewed and agree with the history as documented  Social History     Tobacco Use    Smoking status: Never Smoker    Smokeless tobacco: Never Used   Substance Use Topics    Alcohol use: No     Frequency: Never     Binge frequency: Never    Drug use: No        Review of Systems   All other systems reviewed and are negative  Physical Exam  Physical Exam   Constitutional: He is oriented to person, place, and time  He appears well-developed and well-nourished  Patient is extremely pleasant with good eye contact  He expresses some frustration over lack of feeling improvement though indicates that he is very hopeful that with continued participation in therapy, use of medications in time that he will begin to feel better  HENT:   Head: Normocephalic  Eyes: Conjunctivae and EOM are normal    Cardiovascular: Normal rate, regular rhythm and normal heart sounds  Pulmonary/Chest: Effort normal and breath sounds normal  No respiratory distress  He has no wheezes  Neurological: He is alert and oriented to person, place, and time  Skin: Skin is warm and dry  Psychiatric: He has a normal mood and affect  His speech is normal and behavior is normal  Thought content normal    Nursing note and vitals reviewed        Vital Signs  ED Triage Vitals [09/10/19 1452]   Temperature Pulse Respirations Blood Pressure SpO2   98 2 °F (36 8 °C) 92 18 119/74 97 %      Temp Source Heart Rate Source Patient Position - Orthostatic VS BP Location FiO2 (%)   Oral Monitor Sitting Right arm --      Pain Score       No Pain           Vitals:    09/10/19 1452 09/10/19 1500   BP: 119/74 119/74   Pulse: 92 86   Patient Position - Orthostatic VS: Sitting          Visual Acuity      ED Medications  Medications - No data to display    Diagnostic Studies  Results Reviewed     Procedure Component Value Units Date/Time    Rapid drug screen, urine [[id number]]     Lab Status:  No result Specimen:  Urine     POCT alcohol breath test [[id number]]     Lab Status:  No result                  No orders to display              Procedures  Procedures       ED Course  ED Course as of Sep 10 1546   Tue Sep 10, 2019   1527 Crisis staff Mahesh Dale spoke with patient  She is preparing resources regarding partial hospitalization programs to him  He is receptive regarding this information  He continues to express hope  for his improvement and denies SI/HI  MDM    Disposition  Final diagnoses:   Depression     Time reflects when diagnosis was documented in both MDM as applicable and the Disposition within this note     Time User Action Codes Description Comment    9/10/2019  3:28 PM Jessica Giron [F32 9] Depression       ED Disposition     ED Disposition Condition Date/Time Comment    Discharge Stable Tue Sep 10, 2019  3:28 PM Erasmo Lopez discharge to home/self care  Follow-up Information     Follow up With Specialties Details Why Contact Info Additional Information    Continue following with your therapist at [facility] and other medical providers  Additionally follow up with resources for partial hospitalization program          [address] Emergency Department Emergency Medicine Go to  As needed, If symptoms worsen [address] [facility] [address] ED, [address], [place], [place], [address]          Patient's Medications   Discharge Prescriptions    No medications on file     No discharge procedures on file      ED Provider  Electronically Signed by Jose Simmons MD  09/10/19 6298